# Patient Record
Sex: MALE | Race: OTHER | Employment: OTHER | URBAN - METROPOLITAN AREA
[De-identification: names, ages, dates, MRNs, and addresses within clinical notes are randomized per-mention and may not be internally consistent; named-entity substitution may affect disease eponyms.]

---

## 2017-10-03 ENCOUNTER — TELEPHONE (OUTPATIENT)
Dept: FAMILY MEDICINE CLINIC | Age: 27
End: 2017-10-03

## 2017-10-03 NOTE — TELEPHONE ENCOUNTER
Your Yadiel Napier and his wife are dear friends and would like to know if you will take his son as a new patient.

## 2020-11-18 ENCOUNTER — HOSPITAL ENCOUNTER (OUTPATIENT)
Age: 30
End: 2020-11-18
Payer: COMMERCIAL

## 2020-11-18 ENCOUNTER — HOSPITAL ENCOUNTER (OUTPATIENT)
Dept: GENERAL RADIOLOGY | Age: 30
Discharge: HOME | End: 2020-11-18
Payer: COMMERCIAL

## 2020-11-18 PROCEDURE — 71046 X-RAY EXAM CHEST 2 VIEWS: CPT

## 2022-01-13 ENCOUNTER — HOSPITAL ENCOUNTER (INPATIENT)
Age: 32
LOS: 5 days | Discharge: HOME | DRG: 208 | End: 2022-01-18
Attending: EMERGENCY MEDICINE | Admitting: STUDENT IN AN ORGANIZED HEALTH CARE EDUCATION/TRAINING PROGRAM
Payer: COMMERCIAL

## 2022-01-13 ENCOUNTER — APPOINTMENT (OUTPATIENT)
Dept: GENERAL RADIOLOGY | Age: 32
DRG: 208 | End: 2022-01-13
Payer: COMMERCIAL

## 2022-01-13 DIAGNOSIS — Z93.0 TRACHEOSTOMY DEPENDENT (HCC): ICD-10-CM

## 2022-01-13 DIAGNOSIS — J18.9 PNEUMONIA OF LEFT LOWER LOBE DUE TO INFECTIOUS ORGANISM: Primary | ICD-10-CM

## 2022-01-13 DIAGNOSIS — Z20.822 EXPOSURE TO CONFIRMED CASE OF COVID-19: ICD-10-CM

## 2022-01-13 PROBLEM — J96.20 ACUTE ON CHRONIC RESPIRATORY FAILURE (HCC): Status: ACTIVE | Noted: 2022-01-13

## 2022-01-13 LAB
A/G RATIO: 1.1 (ref 1.1–2.2)
ALBUMIN SERPL-MCNC: 4.3 G/DL (ref 3.4–5)
ALP BLD-CCNC: 98 U/L (ref 40–129)
ALT SERPL-CCNC: 48 U/L (ref 10–40)
ANION GAP SERPL CALCULATED.3IONS-SCNC: 12 MMOL/L (ref 3–16)
AST SERPL-CCNC: 56 U/L (ref 15–37)
BASOPHILS ABSOLUTE: 0 K/UL (ref 0–0.2)
BASOPHILS RELATIVE PERCENT: 0.2 %
BILIRUB SERPL-MCNC: 0.6 MG/DL (ref 0–1)
BUN BLDV-MCNC: 34 MG/DL (ref 7–20)
CALCIUM SERPL-MCNC: 8.8 MG/DL (ref 8.3–10.6)
CHLORIDE BLD-SCNC: 104 MMOL/L (ref 99–110)
CO2: 24 MMOL/L (ref 21–32)
CREAT SERPL-MCNC: 0.7 MG/DL (ref 0.9–1.3)
EOSINOPHILS ABSOLUTE: 0 K/UL (ref 0–0.6)
EOSINOPHILS RELATIVE PERCENT: 0 %
GFR AFRICAN AMERICAN: >60
GFR NON-AFRICAN AMERICAN: >60
GLUCOSE BLD-MCNC: 102 MG/DL (ref 70–99)
HCT VFR BLD CALC: 41.4 % (ref 40.5–52.5)
HEMOGLOBIN: 14.2 G/DL (ref 13.5–17.5)
LACTIC ACID, SEPSIS: 1 MMOL/L (ref 0.4–1.9)
LYMPHOCYTES ABSOLUTE: 0.5 K/UL (ref 1–5.1)
LYMPHOCYTES RELATIVE PERCENT: 16.6 %
MCH RBC QN AUTO: 31.1 PG (ref 26–34)
MCHC RBC AUTO-ENTMCNC: 34.2 G/DL (ref 31–36)
MCV RBC AUTO: 90.8 FL (ref 80–100)
MONOCYTES ABSOLUTE: 0.7 K/UL (ref 0–1.3)
MONOCYTES RELATIVE PERCENT: 22.3 %
NEUTROPHILS ABSOLUTE: 1.9 K/UL (ref 1.7–7.7)
NEUTROPHILS RELATIVE PERCENT: 60.9 %
PDW BLD-RTO: 13.1 % (ref 12.4–15.4)
PLATELET # BLD: 130 K/UL (ref 135–450)
PMV BLD AUTO: 9.3 FL (ref 5–10.5)
POTASSIUM REFLEX MAGNESIUM: 3.6 MMOL/L (ref 3.5–5.1)
PROCALCITONIN: 0.61 NG/ML (ref 0–0.15)
RAPID INFLUENZA  B AGN: NEGATIVE
RAPID INFLUENZA A AGN: NEGATIVE
RBC # BLD: 4.56 M/UL (ref 4.2–5.9)
SARS-COV-2, NAAT: NOT DETECTED
SODIUM BLD-SCNC: 140 MMOL/L (ref 136–145)
TOTAL PROTEIN: 8.1 G/DL (ref 6.4–8.2)
WBC # BLD: 3 K/UL (ref 4–11)

## 2022-01-13 PROCEDURE — 96361 HYDRATE IV INFUSION ADD-ON: CPT

## 2022-01-13 PROCEDURE — 87804 INFLUENZA ASSAY W/OPTIC: CPT

## 2022-01-13 PROCEDURE — 96365 THER/PROPH/DIAG IV INF INIT: CPT

## 2022-01-13 PROCEDURE — 80053 COMPREHEN METABOLIC PANEL: CPT

## 2022-01-13 PROCEDURE — 99282 EMERGENCY DEPT VISIT SF MDM: CPT

## 2022-01-13 PROCEDURE — 2500000003 HC RX 250 WO HCPCS: Performed by: STUDENT IN AN ORGANIZED HEALTH CARE EDUCATION/TRAINING PROGRAM

## 2022-01-13 PROCEDURE — 94002 VENT MGMT INPAT INIT DAY: CPT

## 2022-01-13 PROCEDURE — 71045 X-RAY EXAM CHEST 1 VIEW: CPT

## 2022-01-13 PROCEDURE — U0005 INFEC AGEN DETEC AMPLI PROBE: HCPCS

## 2022-01-13 PROCEDURE — 87040 BLOOD CULTURE FOR BACTERIA: CPT

## 2022-01-13 PROCEDURE — 83605 ASSAY OF LACTIC ACID: CPT

## 2022-01-13 PROCEDURE — 2580000003 HC RX 258: Performed by: PHYSICIAN ASSISTANT

## 2022-01-13 PROCEDURE — 6360000002 HC RX W HCPCS: Performed by: PHYSICIAN ASSISTANT

## 2022-01-13 PROCEDURE — 84145 PROCALCITONIN (PCT): CPT

## 2022-01-13 PROCEDURE — 85025 COMPLETE CBC W/AUTO DIFF WBC: CPT

## 2022-01-13 PROCEDURE — U0003 INFECTIOUS AGENT DETECTION BY NUCLEIC ACID (DNA OR RNA); SEVERE ACUTE RESPIRATORY SYNDROME CORONAVIRUS 2 (SARS-COV-2) (CORONAVIRUS DISEASE [COVID-19]), AMPLIFIED PROBE TECHNIQUE, MAKING USE OF HIGH THROUGHPUT TECHNOLOGIES AS DESCRIBED BY CMS-2020-01-R: HCPCS

## 2022-01-13 PROCEDURE — 2580000003 HC RX 258: Performed by: EMERGENCY MEDICINE

## 2022-01-13 PROCEDURE — 5A1935Z RESPIRATORY VENTILATION, LESS THAN 24 CONSECUTIVE HOURS: ICD-10-PCS | Performed by: STUDENT IN AN ORGANIZED HEALTH CARE EDUCATION/TRAINING PROGRAM

## 2022-01-13 PROCEDURE — 1200000000 HC SEMI PRIVATE

## 2022-01-13 PROCEDURE — 87635 SARS-COV-2 COVID-19 AMP PRB: CPT

## 2022-01-13 PROCEDURE — 96367 TX/PROPH/DG ADDL SEQ IV INF: CPT

## 2022-01-13 RX ORDER — LORATADINE 10 MG/1
10 TABLET ORAL DAILY PRN
COMMUNITY

## 2022-01-13 RX ORDER — SODIUM CHLORIDE 9 MG/ML
INJECTION, SOLUTION INTRAVENOUS CONTINUOUS
Status: ACTIVE | OUTPATIENT
Start: 2022-01-13 | End: 2022-01-14

## 2022-01-13 RX ORDER — 0.9 % SODIUM CHLORIDE 0.9 %
1000 INTRAVENOUS SOLUTION INTRAVENOUS ONCE
Status: COMPLETED | OUTPATIENT
Start: 2022-01-13 | End: 2022-01-13

## 2022-01-13 RX ORDER — ACETAMINOPHEN 160 MG/5ML
SUSPENSION ORAL
COMMUNITY

## 2022-01-13 RX ORDER — BACITRACIN ZINC AND POLYMYXIN B SULFATE 500; 1000 [USP'U]/G; [USP'U]/G
OINTMENT TOPICAL 3 TIMES DAILY
COMMUNITY

## 2022-01-13 RX ORDER — FOLIC ACID 1 MG/1
1 TABLET ORAL DAILY
COMMUNITY

## 2022-01-13 RX ORDER — HEPARIN SODIUM 5000 [USP'U]/ML
5000 INJECTION, SOLUTION INTRAVENOUS; SUBCUTANEOUS EVERY 8 HOURS SCHEDULED
Status: CANCELLED | OUTPATIENT
Start: 2022-01-13

## 2022-01-13 RX ORDER — LACTOSE-REDUCED FOOD/FIBER 0.07 G-1.5
500 LIQUID (ML) ORAL 3 TIMES DAILY
COMMUNITY

## 2022-01-13 RX ORDER — DEXTROAMPHETAMINE SACCHARATE, AMPHETAMINE ASPARTATE MONOHYDRATE, DEXTROAMPHETAMINE SULFATE AND AMPHETAMINE SULFATE 5; 5; 5; 5 MG/1; MG/1; MG/1; MG/1
20 CAPSULE, EXTENDED RELEASE ORAL DAILY PRN
Status: ON HOLD | COMMUNITY
End: 2022-01-14

## 2022-01-13 RX ORDER — FLUTICASONE PROPIONATE 44 UG/1
2 AEROSOL, METERED RESPIRATORY (INHALATION) DAILY
COMMUNITY

## 2022-01-13 RX ADMIN — SODIUM CHLORIDE 1000 ML: 9 INJECTION, SOLUTION INTRAVENOUS at 20:07

## 2022-01-13 RX ADMIN — CEFTRIAXONE 1000 MG: 1 INJECTION, POWDER, FOR SOLUTION INTRAMUSCULAR; INTRAVENOUS at 21:46

## 2022-01-13 RX ADMIN — METRONIDAZOLE 500 MG: 500 INJECTION, SOLUTION INTRAVENOUS at 23:36

## 2022-01-13 RX ADMIN — AZITHROMYCIN MONOHYDRATE 500 MG: 500 INJECTION, POWDER, LYOPHILIZED, FOR SOLUTION INTRAVENOUS at 22:19

## 2022-01-13 ASSESSMENT — PULMONARY FUNCTION TESTS: PIF_VALUE: 17

## 2022-01-13 ASSESSMENT — ENCOUNTER SYMPTOMS
ABDOMINAL PAIN: 0
SHORTNESS OF BREATH: 0
COUGH: 1
VOMITING: 0
NAUSEA: 1
SORE THROAT: 1
DIARRHEA: 1
CHEST TIGHTNESS: 0

## 2022-01-13 ASSESSMENT — PAIN DESCRIPTION - DESCRIPTORS: DESCRIPTORS: ACHING

## 2022-01-13 ASSESSMENT — PAIN DESCRIPTION - PAIN TYPE: TYPE: ACUTE PAIN

## 2022-01-13 ASSESSMENT — PAIN SCALES - GENERAL: PAINLEVEL_OUTOF10: 6

## 2022-01-13 ASSESSMENT — PAIN DESCRIPTION - LOCATION: LOCATION: HEAD

## 2022-01-14 LAB
ANION GAP SERPL CALCULATED.3IONS-SCNC: 12 MMOL/L (ref 3–16)
BASOPHILS ABSOLUTE: 0 K/UL (ref 0–0.2)
BASOPHILS RELATIVE PERCENT: 0.1 %
BUN BLDV-MCNC: 26 MG/DL (ref 7–20)
C-REACTIVE PROTEIN: 67 MG/L (ref 0–5.1)
CALCIUM SERPL-MCNC: 7.8 MG/DL (ref 8.3–10.6)
CHLORIDE BLD-SCNC: 104 MMOL/L (ref 99–110)
CO2: 22 MMOL/L (ref 21–32)
CREAT SERPL-MCNC: 0.6 MG/DL (ref 0.9–1.3)
EOSINOPHILS ABSOLUTE: 0 K/UL (ref 0–0.6)
EOSINOPHILS RELATIVE PERCENT: 0.2 %
GFR AFRICAN AMERICAN: >60
GFR NON-AFRICAN AMERICAN: >60
GLUCOSE BLD-MCNC: 109 MG/DL (ref 70–99)
HCT VFR BLD CALC: 35.7 % (ref 40.5–52.5)
HEMOGLOBIN: 12.4 G/DL (ref 13.5–17.5)
LACTIC ACID, SEPSIS: 1.1 MMOL/L (ref 0.4–1.9)
LYMPHOCYTES ABSOLUTE: 0.5 K/UL (ref 1–5.1)
LYMPHOCYTES RELATIVE PERCENT: 11.1 %
MAGNESIUM: 1.9 MG/DL (ref 1.8–2.4)
MCH RBC QN AUTO: 31.4 PG (ref 26–34)
MCHC RBC AUTO-ENTMCNC: 34.9 G/DL (ref 31–36)
MCV RBC AUTO: 89.9 FL (ref 80–100)
MONOCYTES ABSOLUTE: 0.3 K/UL (ref 0–1.3)
MONOCYTES RELATIVE PERCENT: 7.9 %
NEUTROPHILS ABSOLUTE: 3.5 K/UL (ref 1.7–7.7)
NEUTROPHILS RELATIVE PERCENT: 80.7 %
PDW BLD-RTO: 13.1 % (ref 12.4–15.4)
PLATELET # BLD: 108 K/UL (ref 135–450)
PMV BLD AUTO: 10.2 FL (ref 5–10.5)
POTASSIUM SERPL-SCNC: 3.7 MMOL/L (ref 3.5–5.1)
PROCALCITONIN: 0.5 NG/ML (ref 0–0.15)
RBC # BLD: 3.97 M/UL (ref 4.2–5.9)
SARS-COV-2: DETECTED
SODIUM BLD-SCNC: 138 MMOL/L (ref 136–145)
WBC # BLD: 4.3 K/UL (ref 4–11)

## 2022-01-14 PROCEDURE — 83605 ASSAY OF LACTIC ACID: CPT

## 2022-01-14 PROCEDURE — 6360000002 HC RX W HCPCS: Performed by: INTERNAL MEDICINE

## 2022-01-14 PROCEDURE — 6370000000 HC RX 637 (ALT 250 FOR IP): Performed by: STUDENT IN AN ORGANIZED HEALTH CARE EDUCATION/TRAINING PROGRAM

## 2022-01-14 PROCEDURE — 6360000002 HC RX W HCPCS: Performed by: STUDENT IN AN ORGANIZED HEALTH CARE EDUCATION/TRAINING PROGRAM

## 2022-01-14 PROCEDURE — 94761 N-INVAS EAR/PLS OXIMETRY MLT: CPT

## 2022-01-14 PROCEDURE — 2000000000 HC ICU R&B

## 2022-01-14 PROCEDURE — 2580000003 HC RX 258: Performed by: STUDENT IN AN ORGANIZED HEALTH CARE EDUCATION/TRAINING PROGRAM

## 2022-01-14 PROCEDURE — 2580000003 HC RX 258: Performed by: INTERNAL MEDICINE

## 2022-01-14 PROCEDURE — 36415 COLL VENOUS BLD VENIPUNCTURE: CPT

## 2022-01-14 PROCEDURE — 86140 C-REACTIVE PROTEIN: CPT

## 2022-01-14 PROCEDURE — 80048 BASIC METABOLIC PNL TOTAL CA: CPT

## 2022-01-14 PROCEDURE — 51798 US URINE CAPACITY MEASURE: CPT

## 2022-01-14 PROCEDURE — 94003 VENT MGMT INPAT SUBQ DAY: CPT

## 2022-01-14 PROCEDURE — 99223 1ST HOSP IP/OBS HIGH 75: CPT | Performed by: INTERNAL MEDICINE

## 2022-01-14 PROCEDURE — 2700000000 HC OXYGEN THERAPY PER DAY

## 2022-01-14 PROCEDURE — 87040 BLOOD CULTURE FOR BACTERIA: CPT

## 2022-01-14 PROCEDURE — 85025 COMPLETE CBC W/AUTO DIFF WBC: CPT

## 2022-01-14 PROCEDURE — 84145 PROCALCITONIN (PCT): CPT

## 2022-01-14 PROCEDURE — 51701 INSERT BLADDER CATHETER: CPT

## 2022-01-14 PROCEDURE — 83735 ASSAY OF MAGNESIUM: CPT

## 2022-01-14 RX ORDER — SODIUM CHLORIDE 9 MG/ML
25 INJECTION, SOLUTION INTRAVENOUS PRN
Status: DISCONTINUED | OUTPATIENT
Start: 2022-01-14 | End: 2022-01-18 | Stop reason: HOSPADM

## 2022-01-14 RX ORDER — DEXAMETHASONE SODIUM PHOSPHATE 10 MG/ML
10 INJECTION, SOLUTION INTRAMUSCULAR; INTRAVENOUS EVERY 24 HOURS
Status: DISCONTINUED | OUTPATIENT
Start: 2022-01-14 | End: 2022-01-17

## 2022-01-14 RX ORDER — GUAIFENESIN/DEXTROMETHORPHAN 100-10MG/5
5 SYRUP ORAL EVERY 4 HOURS PRN
Status: DISCONTINUED | OUTPATIENT
Start: 2022-01-14 | End: 2022-01-18 | Stop reason: HOSPADM

## 2022-01-14 RX ORDER — AZITHROMYCIN 500 MG/1
500 TABLET, FILM COATED ORAL EVERY 24 HOURS
Status: DISCONTINUED | OUTPATIENT
Start: 2022-01-14 | End: 2022-01-14

## 2022-01-14 RX ORDER — ACETAMINOPHEN 325 MG/1
650 TABLET ORAL EVERY 6 HOURS PRN
Status: DISCONTINUED | OUTPATIENT
Start: 2022-01-14 | End: 2022-01-18 | Stop reason: HOSPADM

## 2022-01-14 RX ORDER — ONDANSETRON 2 MG/ML
4 INJECTION INTRAMUSCULAR; INTRAVENOUS EVERY 6 HOURS PRN
Status: DISCONTINUED | OUTPATIENT
Start: 2022-01-14 | End: 2022-01-18 | Stop reason: HOSPADM

## 2022-01-14 RX ORDER — ACETAMINOPHEN 650 MG/1
650 SUPPOSITORY RECTAL EVERY 6 HOURS PRN
Status: DISCONTINUED | OUTPATIENT
Start: 2022-01-14 | End: 2022-01-18 | Stop reason: HOSPADM

## 2022-01-14 RX ORDER — SODIUM CHLORIDE 0.9 % (FLUSH) 0.9 %
5-40 SYRINGE (ML) INJECTION EVERY 12 HOURS SCHEDULED
Status: DISCONTINUED | OUTPATIENT
Start: 2022-01-14 | End: 2022-01-18 | Stop reason: HOSPADM

## 2022-01-14 RX ORDER — SODIUM CHLORIDE 0.9 % (FLUSH) 0.9 %
5-40 SYRINGE (ML) INJECTION PRN
Status: DISCONTINUED | OUTPATIENT
Start: 2022-01-14 | End: 2022-01-18 | Stop reason: HOSPADM

## 2022-01-14 RX ADMIN — AMPICILLIN SODIUM AND SULBACTAM SODIUM 3000 MG: 2; 1 INJECTION, POWDER, FOR SOLUTION INTRAMUSCULAR; INTRAVENOUS at 04:40

## 2022-01-14 RX ADMIN — DEXAMETHASONE SODIUM PHOSPHATE 10 MG: 10 INJECTION, SOLUTION INTRAMUSCULAR; INTRAVENOUS at 04:38

## 2022-01-14 RX ADMIN — Medication 10 ML: at 21:05

## 2022-01-14 RX ADMIN — AMPICILLIN SODIUM AND SULBACTAM SODIUM 3000 MG: 2; 1 INJECTION, POWDER, FOR SOLUTION INTRAMUSCULAR; INTRAVENOUS at 15:16

## 2022-01-14 RX ADMIN — SODIUM CHLORIDE: 9 INJECTION, SOLUTION INTRAVENOUS at 01:35

## 2022-01-14 RX ADMIN — ENOXAPARIN SODIUM 40 MG: 100 INJECTION SUBCUTANEOUS at 09:29

## 2022-01-14 RX ADMIN — AMPICILLIN SODIUM AND SULBACTAM SODIUM 3000 MG: 2; 1 INJECTION, POWDER, FOR SOLUTION INTRAMUSCULAR; INTRAVENOUS at 09:27

## 2022-01-14 RX ADMIN — PIPERACILLIN AND TAZOBACTAM 3375 MG: 3; .375 INJECTION, POWDER, LYOPHILIZED, FOR SOLUTION INTRAVENOUS at 21:07

## 2022-01-14 RX ADMIN — AZITHROMYCIN MONOHYDRATE 500 MG: 500 TABLET ORAL at 09:29

## 2022-01-14 ASSESSMENT — PAIN SCALES - GENERAL
PAINLEVEL_OUTOF10: 0

## 2022-01-14 ASSESSMENT — PULMONARY FUNCTION TESTS
PIF_VALUE: 23
PIF_VALUE: 27
PIF_VALUE: 22
PIF_VALUE: 19
PIF_VALUE: 17

## 2022-01-14 NOTE — PROGRESS NOTES
Hospitalist Progress Note      PCP: No primary care provider on file. Date of Admission: 1/13/2022        Subjective: No fever or chills no chest pain. Medications:  Reviewed    Infusion Medications    sodium chloride      sodium chloride 100 mL/hr at 01/14/22 0135     Scheduled Medications    sodium chloride flush  5-40 mL IntraVENous 2 times per day    enoxaparin  40 mg SubCUTAneous Daily    azithromycin  500 mg Oral Q24H    ampicillin-sulbactam  3,000 mg IntraVENous Q6H    dexamethasone  10 mg IntraVENous Q24H     PRN Meds: sodium chloride flush, sodium chloride, acetaminophen **OR** acetaminophen, ondansetron, guaiFENesin-dextromethorphan      Intake/Output Summary (Last 24 hours) at 1/14/2022 1229  Last data filed at 1/14/2022 0958  Gross per 24 hour   Intake 1048.95 ml   Output 900 ml   Net 148.95 ml       Physical Exam Performed:    /70   Pulse 72   Temp 98 °F (36.7 °C) (Oral)   Resp 18   Wt 121 lb 4.1 oz (55 kg)   SpO2 100%     General appearance: No apparent distress  Neck: trach in place   Respiratory:  Normal respiratory effort. Clear to auscultation, bilaterally without Rales/Wheezes/Rhonchi. Cardiovascular: Regular rate and rhythm with normal S1/S2 without murmurs, rubs or gallops. Abdomen: Soft, non-tender, non-distended, PEG in place. Musculoskeletal: No clubbing, cyanosis   Skin: Skin color, texture, turgor normal.  No rashes or lesions.   Neurologic:  No new weakness   Psychiatric: Alert   Capillary Refill: Brisk,3 seconds, normal   Peripheral Pulses: +2 palpable, equal bilaterally       Labs:   Recent Labs     01/13/22 1902 01/14/22 0453   WBC 3.0* 4.3   HGB 14.2 12.4*   HCT 41.4 35.7*   * 108*     Recent Labs     01/13/22 1902 01/14/22 0453    138   K 3.6 3.7    104   CO2 24 22   BUN 34* 26*   CREATININE 0.7* 0.6*   CALCIUM 8.8 7.8*     Recent Labs     01/13/22 1902   AST 56*   ALT 48*   BILITOT 0.6   ALKPHOS 98     No results for input(s): INR in the last 72 hours. No results for input(s): [place] in the last 72 hours. Urinalysis:    No results found for: [place] Calv[place], BACTERIA, RBCUA, BLOODU, [address], [address]    Radiology:  XR CHEST PORTABLE   Final Result      Focal consolidative change within the left lower lobe. Finding may be   suggestive of atelectasis or focal pneumonia. Assessment/Plan:    Active Hospital Problems    Diagnosis     Suspected COVID-19 virus infection [Z20.822]     Pneumonia [J18.9]     Acute on chronic respiratory failure ([facility] [address] [J96.20]      1. Acute on chronic respiratory failure patient on trach, supportive measures, keep saturation above 90%, will consult ID for further recommendations. 2.  Pneumonia, likely aspiration and COVID-pneumonia, patient was started on Unasyn and azithromycin at this time pending ID recommendations also patient was started on Decadron  3. COVID-19 infection as above  4. History of quadriplegia  5. Status post PEG        Diet: Diet NPO  ADULT TUBE FEEDING; Gastrostomy; Other Tube Feeding (specify); isosource 1.5 2cans;  Bolus; 4 TIMES DAILY; 240; 30; Before and after each bolus  Code Status: Full Code      Radha Corral MD

## 2022-01-14 NOTE — PROGRESS NOTES
Comprehensive Nutrition Assessment    Type and Reason for Visit:       Nutrition Recommendations/Plan:   Provide 240 ml  Jevity 1.5 4x per day as home TF regimen  Monitor tolerance  Monitor weight, labs, skin, bowels    Nutrition Assessment:  Pt admitted with suspected Covid, respiratory failure and pneumonia. Pt is quadraplegic with PEG tube. Order received for 240 ml 4x per day of Isosource as pt was receiving at home. Will substitur house equivalent of Jevity 1.5. This meets pts needs providing 1440 calories and 61 gm protein. Will monitor for tolerance. Malnutrition Assessment:  Malnutrition Status:  Insufficient data    Context:  Acute Illness       RD did not conduct direct, in-person nutrition evaluation in efforts to reduce exposure and use of PPE for high risk persons, PUI persons, patients who have tested positive for Covid-19 or those awaiting respiratory panel results. EMR was screened for nutrition risk factors, as defined per nutrition standards of care. Estimated Daily Nutrient Needs:  Energy (kcal):  5744-4463 (25-30 x 55 kgm CBW); Weight Used for Energy Requirements:  Current     Protein (g):  55-72 (1-1.3 x 55 CBW); Weight Used for Protein Requirements:  Current        Fluid (ml/day):  per provider; Method Used for Fluid Requirements:         Nutrition Related Findings:  no edema, no BM recorded      Wounds:  None       Current Nutrition Therapies:    Diet NPO  ADULT TUBE FEEDING; Gastrostomy; Other Tube Feeding (specify); isosource 1.5 2cans; Bolus; 4 TIMES DAILY; 240; 30; Before and after each bolus    Anthropometric Measures:  · Height: 5' (152.4 cm)  · Current Body Weight: 121 lb 4.1 oz (55 kg)      · Ideal Body Weight: 106 lbs; % Ideal Body Weight 114.4 %   · BMI: 23.7  · Adjusted Body Weight:  ; No Adjustment   · BMI Categories: Normal Weight (BMI 18.5-24. 9)       Nutrition Diagnosis:   · Inadequate oral intake related to acute injury/trauma as evidenced by NPO or clear liquid status due to medical condition      Nutrition Interventions:   Food and/or Nutrient Delivery:  Continue Oral Nutrition Supplement  Nutrition Education/Counseling:  No recommendation at this time   Coordination of Nutrition Care:  Continue to monitor while inpatient    Goals:   Tolerance of TF at goal rate       Nutrition Monitoring and Evaluation:   Behavioral-Environmental Outcomes:  None Identified   Food/Nutrient Intake Outcomes:  Enteral Nutrition Intake/Tolerance  Physical Signs/Symptoms Outcomes:  Biochemical Data,Fluid Status or Edema,Nutrition Focused Physical Findings,Skin,Weight     Discharge Planning:    No discharge needs at this time     Electronically signed by Ryland Ruiz RD, LD on 1/14/22 at 3:40 PM EST    Contact: [phone]

## 2022-01-14 NOTE — PROGRESS NOTES
Patient arrives to unit; patient presents with chronic trach; patient alert and oriented x 4 nods and uses written communication appropriately. Patient currently on ventilator; spare trach at bedside. Skin intact; chronic g-tube; some redness around g-tube site; denies pain.  Patient states he is able to urinate some on his own but is primarily straight cathed

## 2022-01-14 NOTE — ED PROVIDER NOTES
[address] [place] [address]  eMERGENCY dEPARTMENT eNCOUnter   Physician Attestation    Pt Name: Alexia Burdick  MRN: [id number]  Davongfjoel 1990  Date of evaluation: 1/13/22        Physician Note:    I havepersonally performed and/or participated in the history, exam and medical decision making and agree with all pertinent clinical information. I have also reviewed and agree with the past medical, family and social historyunless otherwise noted. I have personally performed a face to face diagnostic evaluation onthis patient. I have reviewed the mid-levels findings and agree. History: Sx's started Sunday with HA, cough congestion. Covid positive at home. Quadriplegic from prior discussion he is more quadriparetic that he is plegic. Jennifer Brittle Vent dependent but does have periods where he can breathe on his own but will go apneic when he sleeps. Patient does use a ventilator at night. However he has not really needed any oxygen. He does still walk. His right side is weaker than the left side. Basically, just started getting ill on Sunday with aches pains congestion and fever. His home COVID test was positive. Physical Exam  Constitutional:       Appearance: He is well-developed and underweight. He is not diaphoretic. HENT:      Head: Normocephalic and atraumatic. Right Ear: External ear normal.      Left Ear: External ear normal.   Eyes:      General: No scleral icterus. Right eye: No discharge. Left eye: No discharge. Neck:      Thyroid: No thyromegaly. Vascular: No JVD. Trachea: No tracheal deviation. Cardiovascular:      Rate and Rhythm: Regular rhythm. Tachycardia present. Heart sounds: Normal heart sounds. No murmur heard. No friction rub. No gallop. Pulmonary:      Breath sounds: No stridor. No wheezing or rales. Comments: Harsh breath sounds  Abdominal:      General: There is no distension. Palpations: Abdomen is soft.       Tenderness: There is no abdominal tenderness. There is no guarding or rebound. Musculoskeletal:         General: No tenderness. Cervical back: Normal range of motion. Skin:     General: Skin is warm and dry. Findings: No rash (On exposed body surfaces). Neurological:      Mental Status: He is alert and oriented to person, place, and time. Motor: Weakness present. Comments: He does have use of all 4 extremities. He is a bit weaker on the right but able to break gravity. Psychiatric:         Behavior: Behavior normal.         Thought Content: Thought content normal.       XR CHEST PORTABLE    Result Date: 1/13/2022  EXAMINATION: ONE XRAY VIEW OF THE CHEST 1/13/2022 6:58 pm COMPARISON: 11/18/2020. HISTORY: ORDERING SYSTEM PROVIDED HISTORY: cough, sob, trach dependent TECHNOLOGIST PROVIDED HISTORY: Reason for exam:->cough, sob, trach dependent Reason for Exam: cough, sob, trach dependent, covid positive FINDINGS: Chest radiograph: The cardiomediastinal silhouette and hilar contours are normal.  Thoracostomy tube noted. Consolidative change within the left lower lobe. The lungs are otherwise clear with no  pleural effusion or pneumothorax. The overlying soft tissue and osseous structures do not demonstrate acute abnormality. Mild dextroscoliosis of midthoracic spine     Focal consolidative change within the left lower lobe. Finding may be suggestive of atelectasis or focal pneumonia.    Results for orders placed or performed during the hospital encounter of 01/13/22   COVID-19, Rapid    Specimen: Nasopharyngeal Swab   Result Value Ref Range    SARS-CoV-2, NAAT Not Detected Not Detected   Rapid influenza A/B antigens    Specimen: Nares   Result Value Ref Range    Rapid Influenza A Ag Negative Negative    Rapid Influenza B Ag Negative Negative   CBC Auto Differential   Result Value Ref Range    WBC 3.0 (L) 4.0 - 11.0 K/uL    RBC 4.56 4.20 - 5.90 M/uL    Hemoglobin 14.2 13.5 - 17.5 g/dL Hematocrit 41.4 40.5 - 52.5 %    MCV 90.8 80.0 - 100.0 fL    MCH 31.1 26.0 - 34.0 pg    MCHC 34.2 31.0 - 36.0 g/dL    RDW 13.1 12.4 - 15.4 %    Platelets 880 (L) 304 - 450 K/uL    MPV 9.3 5.0 - 10.5 fL    Neutrophils % 60.9 %    Lymphocytes % 16.6 %    Monocytes % 22.3 %    Eosinophils % 0.0 %    Basophils % 0.2 %    Neutrophils Absolute 1.9 1.7 - 7.7 K/uL    Lymphocytes Absolute 0.5 (L) 1.0 - 5.1 K/uL    Monocytes Absolute 0.7 0.0 - 1.3 K/uL    Eosinophils Absolute 0.0 0.0 - 0.6 K/uL    Basophils Absolute 0.0 0.0 - 0.2 K/uL   Comprehensive Metabolic Panel w/ Reflex to MG   Result Value Ref Range    Sodium 140 136 - 145 mmol/L    Potassium reflex Magnesium 3.6 3.5 - 5.1 mmol/L    Chloride 104 99 - 110 mmol/L    CO2 24 21 - 32 mmol/L    Anion Gap 12 3 - 16    Glucose 102 (H) 70 - 99 mg/dL    BUN 34 (H) 7 - 20 mg/dL    CREATININE 0.7 (L) 0.9 - 1.3 mg/dL    GFR Non-African American >60 >60    GFR African American >60 >60    Calcium 8.8 8.3 - 10.6 mg/dL    Total Protein 8.1 6.4 - 8.2 g/dL    Albumin 4.3 3.4 - 5.0 g/dL    Albumin/Globulin Ratio 1.1 1.1 - 2.2    Total Bilirubin 0.6 0.0 - 1.0 mg/dL    Alkaline Phosphatase 98 40 - 129 U/L    ALT 48 (H) 10 - 40 U/L    AST 56 (H) 15 - 37 U/L   Lactate, Sepsis   Result Value Ref Range    Lactic Acid, Sepsis 1.0 0.4 - 1.9 mmol/L   Procalcitonin   Result Value Ref Range    Procalcitonin 0.61 (H) 0.00 - 0.15 ng/mL     Our rapid COVID was negative but he did have 1 that was positive at home. We will do a PCR. For now we will treat him as if he has a regular pneumonia. 1. Pneumonia of left lower lobe due to infectious organism          DISPOSITION/PLAN  PATIENT REFERRED TO:  No follow-up provider specified.   DISCHARGE MEDICATIONS:  New Prescriptions    No medications on file         MD Domenico Blanc MD  01/13/22 4755

## 2022-01-14 NOTE — CARE COORDINATION
Advance Care Planning     Advance Care Planning Activator (Inpatient)  Conversation Note      Date of ACP Conversation: 1/14/2022     Conversation Conducted with:  Healthcare Decision Maker: Next of Kin by law (only applies in absence of above) (name) father Yamile Giraldo    ACP Activator: Minnie Duarte, [phone] [phone] Maker:     Current Designated Health Care Decision Maker:     Primary Decision Maker: Darrell Leon - Parent - [phone]    Care Preferences    Ventilation: \"If you were in your present state of health and suddenly became very ill and were unable to breathe on your own, what would your preference be about the use of a ventilator (breathing machine) if it were available to you? \"      Would the patient desire the use of ventilator (breathing machine)?: yes    \"If your health worsens and it becomes clear that your chance of recovery is unlikely, what would your preference be about the use of a ventilator (breathing machine) if it were available to you? \"     Would the patient desire the use of ventilator (breathing machine)?: Yes      Resuscitation  \"CPR works best to restart the heart when there is a sudden event, like a heart attack, in someone who is otherwise healthy. Unfortunately, CPR does not typically restart the heart for people who have serious health conditions or who are very sick. \"    \"In the event your heart stopped as a result of an underlying serious health condition, would you want attempts to be made to restart your heart (answer \"yes\" for attempt to resuscitate) or would you prefer a natural death (answer \"no\" for do not attempt to resuscitate)? \" yes       [] Yes   [x] No   Educated Patient / June Stubbs regarding differences between Advance Directives and portable DNR orders.     Length of ACP Conversation in minutes:  5 minutes    Conversation Outcomes:  [x] ACP discussion completed  [] Existing advance directive reviewed with patient; no changes to patient's previously recorded wishes  [] New Advance Directive completed  [] Portable Do Not Rescitate prepared for Provider review and signature  [] POLST/POST/MOLST/MOST prepared for Provider review and signature      Follow-up plan:    [] Schedule follow-up conversation to continue planning  [] Referred individual to Provider for additional questions/concerns   [] Advised patient/agent/surrogate to review completed ACP document and update if needed with changes in condition, patient preferences or care setting    [x] This note routed to one or more involved healthcare providers  Electronically signed by Bipin Street RN Case Management [phone] on 1/14/2022 at 1:25 PM

## 2022-01-14 NOTE — H&P
Hospital Medicine History & Physical      PCP: No primary care provider on file. Date of Admission: 1/13/2022    Date of Service: Pt seen/examined on 1/13/2022 and Admitted to Inpatient     Chief Complaint: Fatigue, shortness of breath, fever and chills, decreased activity level      History Of Present Illness: The patient is a 27 y.o. male with past medical history of quadriplegia from previous C1/C2 spinal cord injury at birth, chronic trach and G-tube in place, ADHD, ulcerative colitis, neurogenic bladder, central alveolar hypoventilation syndrome, who presents to [facility] brought by his father for concern that for the last 2 to 3 days patient and as well as family have had increasing levels of fatigue but family members are not as fatigued or sick as the patient is at this time. Apparently patient has also had increasing concerning lack of appetite and fatigue. He has seemed to be more short of breath according to the parents and apparently has had some intermittent fever and chills at home. They note that although he is quadriplegic he does have increased activity levels in some time able to ambulate and move appropriately. However in the last 2 to 3 days he has seemed more inactive and fatigued. Uncertain whether patient may have gotten sick from family members or from previous out going to [place]. Father notes that he, wife, and the patient all tested with a rapid COVID test at home and while the wife and him were both negative patient apparently tested positive which prompted further evaluation in the ED. Other than symptoms as above, father denies the patient has had any other symptoms such as diarrhea, vomiting, dysuria, blood in urine/stool/sputum, chest pain. Past Medical History:    No past medical history on file.     Past Surgical History: No past surgical history on file. Medications Prior to Admission:    Prior to Admission medications    Medication Sig Start Date End Date Taking? Authorizing Provider   acetaminophen (TYLENOL) 160 MG/5ML liquid Take 20mLs every 4 hours as needed through G-tube   Yes Historical Provider, MD   bacitracin-polymyxin b (POLYSPORIN) [id number] UNIT/GM ointment Apply topically 3 times daily   Yes Historical Provider, MD   amphetamine-dextroamphetamine (ADDERALL XR) 20 MG extended release capsule 20 mg daily as needed. Through G-tube   Yes Historical Provider, MD   fluticasone (FLOVENT HFA) 44 MCG/ACT inhaler Inhale 2 puffs into the lungs daily   Yes Historical Provider, MD   folic acid (FOLVITE) 1 MG tablet Take 1 mg by mouth daily   Yes Historical Provider, MD   Nutritional Supplements (ISOSOURCE 1.5 SOLITARIO) LIQD Take 500 mLs by mouth 3 times daily   Yes Historical Provider, MD   loratadine (CLARITIN) 10 MG tablet Take 10 mg by mouth daily as needed    Historical Provider, MD       Allergies: Other    Social History:  The patient currently lives home    TOBACCO:   has no history on file for tobacco use. ETOH:   has no history on file for alcohol use. Family History:  Reviewed in detail and negative for DM, Early CAD, Cancer, CVA. Positive as follows:    No family history on file. REVIEW OF SYSTEMS:    as noted in the HPI. All other systems reviewed and negative. PHYSICAL EXAM:    /67   Pulse 80   Temp 98.1 °F (36.7 °C) (Oral)   Resp 16   Wt 121 lb 4.1 oz (55 kg)   SpO2 99%     General appearance: Has trach collar and hooked up to ventilator, alert and oriented and able to follow conversation but limited speech ability at this time, nontoxic appearing but fatigued appearing  HEENT Normal cephalic, atraumatic without obvious deformity. Pupils equal, round, and reactive to light. Extra ocular muscles intact. Conjunctivae/corneas clear.   Dry mucous membranes,  Neck: Supple, tracheostomy is noted Merced Coreas is expected to be hospitalized for greater than 2 midnights based on the following assessment and plan:      ASSESSMENT/PLAN:  · Acute on chronic respiratory failure  · Pneumonia  · Suspected COVID-19 virus infection    Plan:  · Patient tested with a positive rapid test at home but then rapid test here came back negative, COVID PCR came back positive  · Suspect combination of possible COVID-pneumonia as well as aspiration pneumonia  · Started patient on Unasyn and Zithromax for broad-spectrum bacterial pneumonia coverage  · Start patient on Decadron IV for COVID-pneumonia  · Start patient on water  · Normal saline for IV fluid hydration x20 hours  · Repeat labs in the morning    DVT Prophylaxis: Lovenox  Diet: Diet NPO  Code Status: Full Code  PT/OT Eval Status: Ambulatory    Dispo -pending clinical course       Matt Campos DO    Thank you No primary care provider on file. for the opportunity to be involved in this patient's care. If you have any questions or concerns please feel free to contact me at [phone].

## 2022-01-14 NOTE — PLAN OF CARE
Patient presents with dysuria starting this am. He denies any concern for STI.    He also is concerned about a wart on left foot x 1month.     Problem: Nutrition  Goal: Optimal nutrition therapy  Outcome: Ongoing   Nutrition Problem #1: Inadequate oral intake  Intervention: Food and/or Nutrient Delivery: Continue Oral Nutrition Supplement  Nutritional Goals:  Tolerance of TF at goal rate

## 2022-01-14 NOTE — CONSULTS
Infectious Diseases Inpatient Consult Note      Reason for Consult:  COVID 19 PNA and resp failure    Requesting Physician:  Altagracia Waldrop     Primary Care Physician:  Karen Acevedo MD    History Obtained From:  Epic and Patient     CHIEF COMPLAINT:     Chief Complaint   Patient presents with    Positive For Covid-19     having weakness, headache, cough, has a trach. HISTORY OF PRESENT ILLNESS:  27 y.o. 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 Tested +Ve for COVID 19 , Procal 0.61  And CRP 67, mild Leukopenia, and thrombocytopenia, rapid flu negative and Blood cx in process. CXR with Left lower lobe consolidation - We are consulted for IV abx management - No history from patient due to Ventilated status through Abelino Angel-           Past Medical History:    Quadriplegia  Trach   PEG      Past Surgical History:    Trach and PEG     Current Medications:    Outpatient Medications Marked as Taking for the 1/13/22 encounter [facility] Encounter)   Medication Sig Dispense Refill    acetaminophen (TYLENOL) 160 MG/5ML liquid Take 20mLs every 4 hours as needed through G-tube      bacitracin-polymyxin b (POLYSPORIN) 500-89417 UNIT/GM ointment Apply topically 3 times daily      fluticasone (FLOVENT HFA) 44 MCG/ACT inhaler Inhale 2 puffs into the lungs daily      folic acid (FOLVITE) 1 MG tablet Take 1 mg by mouth daily      Nutritional Supplements (ISOSOURCE 1.5 SOLITARIO) LIQD Take 500 mLs by mouth 3 times daily         Allergies: Other    Immunizations : There is no immunization history on file for this patient.       Social History:    Social History     Tobacco Use    Smoking status: Not on file    Smokeless tobacco: Not on file   Substance Use Topics    Alcohol use: Not on file    Drug use: Not on file     Social History     Tobacco Use   Smoking Status Not on file   Smokeless Tobacco Not on file      Family history : no DVT no COPD       REVIEW OF SYSTEMS:     NOT Possible due to ventilated state   PHYSICAL EXAM:      Vitals:    /70   Pulse 72   Temp 98 °F (36.7 °C) (Oral)   Resp 18   Wt 121 lb 4.1 oz (55 kg)   SpO2 100%     General Appearance: AWAKE on the vent through Trach+  and in some acute distress, ++ pallor, no icterus   Skin: warm and dry, no rash or erythema  Head: normocephalic and atraumatic  Eyes: pupils equal, round, and reactive to light, conjunctivae normal  ENT: tympanic membrane, external ear and ear canal normal bilaterally, nose without deformity, nasal mucosa and turbinates normal without polyps  Neck: supple and non-tender without mass, no thyromegaly  no cervical lymphadenopathy  Pulmonary/Chest: BI basal crepts++  wheezes, rales or rhonchi, normal air movement, in some respiratory distress  Cardiovascular: l S1 and S2, no murmurs, rubs, clicks, or gallops, no carotid bruits  Abdomen: soft, non-tender, non-distended, normal bowel sounds, no masses or organomegaly  PEG tube   Extremities: Quadriplegia+   Musculoskeletal: normal range of motion, no joint swelling, deformity or tenderness  Integumentary: No rashes, no abnormal skin lesions, no petechiae  Neurologic: Quadriplegia+  Lines: IV    DATA:    CBC:   Lab Results   Component Value Date    WBC 4.3 01/14/2022    HGB 12.4 (L) 01/14/2022    HCT 35.7 (L) 01/14/2022    MCV 89.9 01/14/2022     (L) 01/14/2022     RENAL:   Lab Results   Component Value Date    CREATININE 0.6 (L) 01/14/2022    BUN 26 (H) 01/14/2022     01/14/2022    K 3.7 01/14/2022     01/14/2022    CO2 22 01/14/2022     SED RATE: No results found for: SEDRATE  CK: No results found for: CKTOTAL  CRP:   Lab Results   Component Value Date    CRP 67.0 01/14/2022     Hepatic Function Panel:   Lab Results   Component Value Date    ALKPHOS 98 01/13/2022    ALT 48 01/13/2022    AST 56 01/13/2022    PROT 8.1 01/13/2022    BILITOT 0.6 01/13/2022    LABALBU 4.3 01/13/2022     UA:No results found for: Yahaira Rajendra, GLUCOSEU, Rilla Rash, SPECGRAV, BLOODU, PHUR, PROTEINU, UROBILINOGEN, NITRU, LEUKOCYTESUR, LABMICR, URINETYPE   Urine Microscopic: No results found for: LABCAST, BACTERIA, COMU, HYALCAST, WBCUA, RBCUA, EPIU  Urine Reflex to Culture: No results found for: URRFLXCULT    Procal 0.5     CRP  67       MICRO: cultures reviewed and updated by me     Procedure Component Value Units Date/Time   Culture, Blood 2 [[id number]] Collected: 01/14/22 0324   Order Status: Sent Specimen: Blood Updated: 01/14/22 0335   Rapid influenza A/B antigens [[id number]] Collected: 01/13/22 1902   Order Status: Completed Specimen: Nares Updated: 01/13/22 1953    Rapid Influenza A Ag Negative    Rapid Influenza B Ag Negative   Narrative:     Performed at:   [facility]   [address] [facility] [employer] [address]   Phone [phone], Rapid [[id number]] Collected: 01/13/22 1902   Order Status: Completed Specimen: Nasopharyngeal Swab Updated: 01/13/22 1953    SARS-CoV-2, NAAT Not Detected    Comment: Rapid NAAT:   Negative results should be treated as presumptive and,   if inconsistent with clinical signs and symptoms or necessary for   patient management, should be tested with an alternative molecular   assay. Negative results do not preclude SARS-CoV-2 infection and   should not be used as the sole basis for patient management decisions. This test has been authorized by the FDA under an Emergency Use   Authorization (EUA) for use by authorized laboratories.      Fact sheet for Healthcare Providers:   BuildHer.es   Fact sheet for Patients: BuildHer.es     METHODOLOGY: Isothermal Nucleic Acid Amplification       Narrative:     Performed at:   [facility]   [address] [address]   Phone [phone]   Culture, Blood 1 [[id number]] Collected: 01/13/22 1902   Order Status: Sent Specimen: Blood Updated: 01/        Result Notes     Ref Range & Units 1/13/22 2035   SARS-CoV-2 Not detected Detected Abnormal     Comment: This test has been authorized by FDA under an   Emergency Use Authorization (EUA). This test is only authorized for the duration of the   time of declaration that circumstances exist justifying the   authorization of the emergency use of in vitro diagnostic          Blood Culture: No results found for: BC, BLOODCULT2    Viral Culture:    Lab Results   Component Value Date    COVID19 Detected 01/13/2022    COVID19 Not Detected 01/13/2022     Urine Culture: No results for input(s): Lisa Beck in the last 72 hours. Scheduled Meds:   sodium chloride flush  5-40 mL IntraVENous 2 times per day    enoxaparin  40 mg SubCUTAneous Daily    azithromycin  500 mg Oral Q24H    ampicillin-sulbactam  3,000 mg IntraVENous Q6H    dexamethasone  10 mg IntraVENous Q24H       Continuous Infusions:   sodium chloride      sodium chloride 100 mL/hr at 01/14/22 0135       PRN Meds:  sodium chloride flush, sodium chloride, acetaminophen **OR** acetaminophen, ondansetron, guaiFENesin-dextromethorphan    Imaging:   XR CHEST PORTABLE   Final Result      Focal consolidative change within the left lower lobe. Finding may be   suggestive of atelectasis or focal pneumonia. All pertinent images and reports for the current Hospitalization were reviewed by me.     IMPRESSION:    Patient Active Problem List   Diagnosis    Suspected COVID-19 virus infection    Pneumonia    Acute on chronic respiratory failure (HCC)     COVID 19 PNA  Hypoxic resp failure on Vent through Trach  Quadriplegia from C spine injury   PEG tube in place  Lymphopenia   Thrombocytopenia from COVID 19  Left lower lobe consolidation  CRP elevation     Given his resp status will need to cont IV abx and steroids will check Trach aspirate for MDRO      Labs, Microbiology, Radiology and pertinent results from current hospitalization and care every where were reviewed by me as a part of the consultation. PLAN :  1. Cont IV Daptomycin x 10 mg daily   2. D/C IV Unasyn   3. Change to IV Zosyn x 3.375 mg x q 8 hrs  4. Check Trach aspirate  5. Trend CRP and Procal     Discussed with patient/Family and Nursing   Risk of Complications/Morbidity: High      · Illness(es)/ Infection present that pose threat to bodily function. · There is potential for severe exacerbation of infection/side effects of treatment. · Therapy requires intensive monitoring for antimicrobial agent toxicity. Thanks for allowing me to participate in your patient's care please call me with any questions or concerns.     Dr. Quan Rothman MD  [address] [place] [address] Physician  Phone: [phone]   Fax : [phone]

## 2022-01-14 NOTE — PROGRESS NOTES
Medication Reconciliation     List of medications patient is currently taking is complete. Source of information:   1. Conversation with patient and father at bedside  2. EPIC records        Notes regarding home medications:  1. Patient received some of his home medications today PTA. 2. All medications are administered via G-tube    Denies any other OTC/herbal medications.     Robert Maldonado, Pharmacy Intern

## 2022-01-14 NOTE — CARE COORDINATION
INITIAL CASE MANAGEMENT ASSESSMENT    Unable to meet with patient due to isolation status. Patient on ventilator, spoke with patient's father Karl Nicole over the phone to assess possible discharge needs. Explained Case Management role/services. Living Situation: Confirmed address, lives with parents in a 2 story house, 2 steps to enter    ADLs: Independent -- able to walk, dress/bath self, gives own tube feedings; family assists PRN, family completes most homemaking duties     DME: Ventilator, enteral feedings & supplies -- provided by [facility]    PT/OT Recs: Not ordered     Active Services: Respiratory Therapist comes once a month     Transportation: Family transports PRN     Medications: Uses [facility] in [address] -- no issues    PCP: Severo Motts, MD      HD/PD: n/a    PLAN/COMMENTS: Father plans on patient returning home. No anticipated needs. Family transport. CM provided contact information for patient or family to call with any questions. CM will follow and assist as needed.   Electronically signed by Santa Hartman RN Case Management [phone] on 1/14/2022 at 1:25 PM

## 2022-01-14 NOTE — ED PROVIDER NOTES
[address] [place]        Pt Name: Ivonne Mccollum  MRN: [id number]  Armstrongfjoel 1990  Date of evaluation: 1/13/2022  Provider: CARMEN Webb  PCP: No primary care provider on file. Note Started: 8:53 PM EST        I have seen and evaluated this patient with my supervising physician Ashley Fernando MD.    [address] [place] [address]       Chief Complaint   Patient presents with    Positive For Covid-19     having weakness, headache, cough, has a trach. HISTORY OF PRESENT ILLNESS   (Location, Timing/Onset, Context/Setting, Quality, Duration, Modifying Factors, Severity, Associated Signs and Symptoms)  Note limiting factors. Chief Complaint: Concern for Ernestina Khan is a 27 y.o. male who presents to the emergency department today with his father with complaints of concern for COVID. Patient has been feeling unwell since Sunday night, his symptoms include weakness, headache, cough, and just generally feeling unwell. He is vaccinated for COVID, having received it in March and April. He has not yet received his booster. Patient does have a history of having a trach, this has been in place since he was a child as he had a spinal cord injury at birth. He wears the ventilator at night only, generally does not wear it during the daytime hours, unless he is ill. He does have a history of frequent pneumonia. He is not currently taking any antibiotics. He did have a positive COVID exposure at [employer] recently. He has no further complaints at this time. Nursing Notes were all reviewed and agreed with or any disagreements were addressed in the HPI. REVIEW OF SYSTEMS    (2-9 systems for level 4, 10 or more for level 5)     Review of Systems   Constitutional: Positive for chills and fatigue. Negative for fever. HENT: Positive for congestion and sore throat. Respiratory: Positive for cough.  Negative for chest tightness and shortness of breath. Cardiovascular: Negative for chest pain and palpitations. Gastrointestinal: Positive for diarrhea and nausea. Negative for abdominal pain and vomiting. Neurological: Positive for headaches. Negative for dizziness. Positives and Pertinent negatives as per HPI. Except as noted above in the ROS, all other systems were reviewed and negative. PAST MEDICAL HISTORY   No past medical history on file. SURGICAL HISTORY   No past surgical history on file. CURRENTMEDICATIONS       Previous Medications    ACETAMINOPHEN (TYLENOL) 160 MG/5ML LIQUID    Take 20mLs every 4 hours as needed through G-tube    AMPHETAMINE-DEXTROAMPHETAMINE (ADDERALL XR) 20 MG EXTENDED RELEASE CAPSULE    20 mg daily as needed. Through G-tube    BACITRACIN-POLYMYXIN B (POLYSPORIN) 500-50028 UNIT/GM OINTMENT    Apply topically 3 times daily    FLUTICASONE (FLOVENT HFA) 44 MCG/ACT INHALER    Inhale 2 puffs into the lungs daily    FOLIC ACID (FOLVITE) 1 MG TABLET    Take 1 mg by mouth daily    LORATADINE (CLARITIN) 10 MG TABLET    Take 10 mg by mouth daily as needed    NUTRITIONAL SUPPLEMENTS (ISOSOURCE 1.5 SOLITARIO) LIQD    Take 500 mLs by mouth 3 times daily         ALLERGIES     Other    FAMILYHISTORY     No family history on file. SOCIAL HISTORY       Social History     Tobacco Use    Smoking status: Not on file    Smokeless tobacco: Not on file   Substance Use Topics    Alcohol use: Not on file    Drug use: Not on file       SCREENINGS             PHYSICAL EXAM    (up to 7 for level 4, 8 or more for level 5)     ED Triage Vitals [01/13/22 1711]   BP Temp Temp src Pulse Resp SpO2 Height Weight   92/60 98.4 °F (36.9 °C) -- 103 16 98 % -- --       Physical Exam  Vitals and nursing note reviewed. Constitutional:       General: He is not in acute distress. Appearance: Normal appearance. He is well-developed. He is not ill-appearing, toxic-appearing or diaphoretic.    HENT:      Head: Normocephalic and atraumatic. Right Ear: Tympanic membrane and ear canal normal.      Left Ear: Tympanic membrane and ear canal normal.   Eyes:      Conjunctiva/sclera: Conjunctivae normal.      Pupils: Pupils are equal, round, and reactive to light. Cardiovascular:      Rate and Rhythm: Normal rate and regular rhythm. Pulmonary:      Effort: Pulmonary effort is normal. No respiratory distress. Breath sounds: No stridor. No wheezing, rhonchi or rales. Musculoskeletal:      Cervical back: Normal range of motion and neck supple. Skin:     General: Skin is warm and dry. Neurological:      Mental Status: He is alert and oriented to person, place, and time. Psychiatric:         Behavior: Behavior normal. Behavior is cooperative. Thought Content:  Thought content normal.         DIAGNOSTIC RESULTS   LABS:    Labs Reviewed   CBC WITH AUTO DIFFERENTIAL - Abnormal; Notable for the following components:       Result Value    WBC 3.0 (*)     Platelets 276 (*)     Lymphocytes Absolute 0.5 (*)     All other components within normal limits    Narrative:     Performed at:  [address] [employer] [address]   Phone [phone]   COMPREHENSIVE METABOLIC PANEL W/ REFLEX TO MG FOR LOW K - Abnormal; Notable for the following components:    Glucose 102 (*)     BUN 34 (*)     CREATININE 0.7 (*)     ALT 48 (*)     AST 56 (*)     All other components within normal limits    Narrative:     Performed at:  [address] [employer] [address]   Phone [phone]   PROCALCITONIN - Abnormal; Notable for the following components:    Procalcitonin 0.61 (*)     All other components within normal limits    Narrative:     Performed at:  [address] [employer] [address]   Phone [phone], RAPID    Narrative:     Performed at:  [facility] ([facility] - [facility]  [address]  [place] [facility] [address]   Phone [phone]   RAPID INFLUENZA A/B ANTIGENS    Narrative:     Performed at:  [facility]  [address]  [place] [address]   Phone [phone]   CULTURE, BLOOD 1   CULTURE, BLOOD 2   LACTATE, SEPSIS    Narrative:     Performed at:  [facility]  [address] [place] [address]   Phone [phone]   [address], SEPSIS   COVID-19   COVID-19   COVID-19   COVID-19   COVID-19       When ordered only abnormal lab results are displayed. All other labs were within normal range or not returned as of this dictation. EKG: When ordered, EKG's are interpreted by the Emergency Department Physician in the absence of a cardiologist.  Please see their note for interpretation of EKG. RADIOLOGY:   Non-plain film images such as CT, Ultrasound and MRI are read by the radiologist. Plain radiographic images are visualized and preliminarily interpreted by the ED Provider with the below findings:        Interpretation per the Radiologist below, if available at the time of this note:    XR CHEST PORTABLE   Final Result      Focal consolidative change within the left lower lobe. Finding may be   suggestive of atelectasis or focal pneumonia. XR CHEST PORTABLE    Result Date: 1/13/2022  EXAMINATION: ONE XRAY VIEW OF THE CHEST 1/13/2022 6:58 pm COMPARISON: 11/18/2020. HISTORY: ORDERING SYSTEM PROVIDED HISTORY: cough, sob, trach dependent TECHNOLOGIST PROVIDED HISTORY: Reason for exam:->cough, sob, trach dependent Reason for Exam: cough, sob, trach dependent, covid positive FINDINGS: Chest radiograph: The cardiomediastinal silhouette and hilar contours are normal.  Thoracostomy tube noted. Consolidative change within the left lower lobe. The lungs are otherwise clear with no  pleural effusion or pneumothorax.  The overlying soft tissue and osseous structures do not demonstrate acute abnormality. Mild dextroscoliosis of midthoracic spine     Focal consolidative change within the left lower lobe. Finding may be suggestive of atelectasis or focal pneumonia. PROCEDURES   Unless otherwise noted below, none     Procedures    CONSULTS:  IP CONSULT TO HOSPITALIST      EMERGENCY DEPARTMENT COURSE and DIFFERENTIAL DIAGNOSIS/MDM:   Vitals:    Vitals:    01/13/22 2106 01/13/22 2115 01/13/22 2145 01/13/22 2245   BP:  101/66 (!) 106/50 (!) 96/51   Pulse:       Resp:       Temp:       SpO2: 100% 100% 100% 100%       Patient was given the following medications:  Medications   0.9 % sodium chloride infusion (has no administration in time range)   metronidazole (FLAGYL) 500 mg in NaCl 100 mL IVPB premix (has no administration in time range)   0.9 % sodium chloride bolus (0 mLs IntraVENous Stopped 1/13/22 2057)   azithromycin (ZITHROMAX) 500 mg in D5W 250ml addavial (500 mg IntraVENous New Bag 1/13/22 2219)   cefTRIAXone (ROCEPHIN) 1000 mg IVPB in 50 mL D5W minibag (0 mg IntraVENous Stopped 1/13/22 2218)           ED COURSE & MEDICAL DECISION MAKING    - The patient presented to the ER with complaints of concerns for COVID. Vital signs were reviewed. Exam as above. Peripheral IV placed. Labs, Imaging ordered. - Pertinent Labs & Imaging studies reviewed. (See chart for details)   -  Patient seen and evaluated in the emergency department. -  Triage and nursing notes reviewed and incorporated. -  Old chart records reviewed and incorporated.   -   I have seen and evaluated this patient with my supervising physician Medhat Dean MD.  -  Differential diagnosis includes: pulmonary embolism, COPD/asthma, pneumonia, viral URI, nasal congestion, bacterial sinusitis, viral/strep pharyngitis, otitis media, otitis externa  -  Work-up included:  See above  -  ED treatment included:   Rocephin, Zithromax, IV fluids, placement on a ventilator  - Consults: Respiratory therapy for ventilator management. Hospitalist was consulted for admission.  -  Results discussed with patient and/or family. Labs show white blood cell count of 3, no concerning anemia. Metabolic panel with BUN of 34, creatinine 1.7, ALT 48, AST 56. Lactic acid is not elevated at 1.0. Procalcitonin is mildly elevated at 0.61. His flu swab is negative, rapid COVID is negative, PCR test is ordered given a high level of suspicion. Imaging studies show findings of a left lower lobe pneumonia. At this time, we recommend admission, as the patient is trach dependent, with findings consistent with a left lower lobe pneumonia, high suspicion for possible COVID infection. The patient and/or family is agreeable with plan of care and disposition.  -  Disposition:   Admission  - Critical Care: Because of high probability of sudden clinical deterioration of the patient's condition or  further deterioration, critical care time included my full attention to the patient's condition, including chart data review, documentation, medication ordering, reviewing the patient's old records, reevaluation patient's cardiac, pulmonary and neurological status. Reassessment of vital signs. Consultations with ED attending and admitting physician. Ordering, interpreting reviewing diagnostic testing. Therefore, my critical care time was 18 minutes of direct attention to the patient's condition did not include time spent on separately billable procedures. FINAL IMPRESSION      1. Pneumonia of left lower lobe due to infectious organism    2. Tracheostomy dependent ([facility] [address]    3. Exposure to confirmed case of COVID-19          DISPOSITION/PLAN   DISPOSITION Admitted 01/13/2022 11:17:11 PM      PATIENT REFERRED TO:  No follow-up provider specified.     DISCHARGE MEDICATIONS:  New Prescriptions    No medications on file       DISCONTINUED MEDICATIONS:  Discontinued Medications    No medications on file              (Please note that portions of this note

## 2022-01-15 LAB
ANION GAP SERPL CALCULATED.3IONS-SCNC: 16 MMOL/L (ref 3–16)
BASOPHILS ABSOLUTE: 0 K/UL (ref 0–0.2)
BASOPHILS RELATIVE PERCENT: 0.4 %
BUN BLDV-MCNC: 21 MG/DL (ref 7–20)
CALCIUM SERPL-MCNC: 8.3 MG/DL (ref 8.3–10.6)
CHLORIDE BLD-SCNC: 107 MMOL/L (ref 99–110)
CO2: 19 MMOL/L (ref 21–32)
CREAT SERPL-MCNC: 0.6 MG/DL (ref 0.9–1.3)
EOSINOPHILS ABSOLUTE: 0 K/UL (ref 0–0.6)
EOSINOPHILS RELATIVE PERCENT: 0.7 %
GFR AFRICAN AMERICAN: >60
GFR NON-AFRICAN AMERICAN: >60
GLUCOSE BLD-MCNC: 181 MG/DL (ref 70–99)
HCT VFR BLD CALC: 38.5 % (ref 40.5–52.5)
HEMOGLOBIN: 13.3 G/DL (ref 13.5–17.5)
LYMPHOCYTES ABSOLUTE: 0.6 K/UL (ref 1–5.1)
LYMPHOCYTES RELATIVE PERCENT: 8.9 %
MAGNESIUM: 2.1 MG/DL (ref 1.8–2.4)
MCH RBC QN AUTO: 31.5 PG (ref 26–34)
MCHC RBC AUTO-ENTMCNC: 34.6 G/DL (ref 31–36)
MCV RBC AUTO: 91.1 FL (ref 80–100)
MONOCYTES ABSOLUTE: 0.2 K/UL (ref 0–1.3)
MONOCYTES RELATIVE PERCENT: 3.3 %
NEUTROPHILS ABSOLUTE: 6 K/UL (ref 1.7–7.7)
NEUTROPHILS RELATIVE PERCENT: 86.7 %
PDW BLD-RTO: 13.3 % (ref 12.4–15.4)
PLATELET # BLD: 75 K/UL (ref 135–450)
PLATELET SLIDE REVIEW: ABNORMAL
PMV BLD AUTO: 11.5 FL (ref 5–10.5)
POTASSIUM SERPL-SCNC: 3.8 MMOL/L (ref 3.5–5.1)
RBC # BLD: 4.22 M/UL (ref 4.2–5.9)
RBC # BLD: NORMAL 10*6/UL
SLIDE REVIEW: ABNORMAL
SODIUM BLD-SCNC: 142 MMOL/L (ref 136–145)
VACUOLATED NEUTROPHILS: PRESENT
WBC # BLD: 6.9 K/UL (ref 4–11)

## 2022-01-15 PROCEDURE — 2580000003 HC RX 258: Performed by: INTERNAL MEDICINE

## 2022-01-15 PROCEDURE — 80048 BASIC METABOLIC PNL TOTAL CA: CPT

## 2022-01-15 PROCEDURE — 6360000002 HC RX W HCPCS: Performed by: STUDENT IN AN ORGANIZED HEALTH CARE EDUCATION/TRAINING PROGRAM

## 2022-01-15 PROCEDURE — 2000000000 HC ICU R&B

## 2022-01-15 PROCEDURE — 94761 N-INVAS EAR/PLS OXIMETRY MLT: CPT

## 2022-01-15 PROCEDURE — 36415 COLL VENOUS BLD VENIPUNCTURE: CPT

## 2022-01-15 PROCEDURE — 85025 COMPLETE CBC W/AUTO DIFF WBC: CPT

## 2022-01-15 PROCEDURE — 83735 ASSAY OF MAGNESIUM: CPT

## 2022-01-15 PROCEDURE — 6360000002 HC RX W HCPCS: Performed by: INTERNAL MEDICINE

## 2022-01-15 PROCEDURE — 94003 VENT MGMT INPAT SUBQ DAY: CPT

## 2022-01-15 PROCEDURE — 2580000003 HC RX 258: Performed by: STUDENT IN AN ORGANIZED HEALTH CARE EDUCATION/TRAINING PROGRAM

## 2022-01-15 RX ADMIN — Medication 10 ML: at 20:52

## 2022-01-15 RX ADMIN — ENOXAPARIN SODIUM 40 MG: 100 INJECTION SUBCUTANEOUS at 09:06

## 2022-01-15 RX ADMIN — PIPERACILLIN AND TAZOBACTAM 3375 MG: 3; .375 INJECTION, POWDER, LYOPHILIZED, FOR SOLUTION INTRAVENOUS at 20:52

## 2022-01-15 RX ADMIN — PIPERACILLIN AND TAZOBACTAM 3375 MG: 3; .375 INJECTION, POWDER, LYOPHILIZED, FOR SOLUTION INTRAVENOUS at 04:52

## 2022-01-15 RX ADMIN — PIPERACILLIN AND TAZOBACTAM 3375 MG: 3; .375 INJECTION, POWDER, LYOPHILIZED, FOR SOLUTION INTRAVENOUS at 13:16

## 2022-01-15 RX ADMIN — DEXAMETHASONE SODIUM PHOSPHATE 10 MG: 10 INJECTION, SOLUTION INTRAMUSCULAR; INTRAVENOUS at 04:05

## 2022-01-15 ASSESSMENT — PAIN SCALES - GENERAL
PAINLEVEL_OUTOF10: 0

## 2022-01-15 ASSESSMENT — PULMONARY FUNCTION TESTS
PIF_VALUE: 15

## 2022-01-15 NOTE — PROGRESS NOTES
Brief Nutrition Note    Nutrition Recommendations/Plan:   Chronic trach. PEG in place. On isosource at home although will change EN order to reflect the equivalent formula provided here: Bolus 4 (237ml) cans Jevity 1.5 daily    Water flush 90ml before and after each can to meet fluid needs    Estimated Daily Nutrient Needs:  Energy (kcal):  3683-2586 (25-30kcal/49kg); Weight Used for Energy Requirements:  Current     Protein (g):  59-69 (1.2-1.4g/49kg); Weight Used for Protein Requirements:  Current        Fluid (ml/day):  per provider    Current Nutrition Therapies:    Diet NPO  ADULT TUBE FEEDING; Gastrostomy; Other Tube Feeding (specify); isosource 1.5 2cans; Bolus; 4 TIMES DAILY; 480; 30; Before and after each bolus  Current Tube Feeding (TF) Orders:  · Feeding Route: PEG  · Formula: Standard with Fiber  · Schedule: Bolus (Four times daily)  · Water Flushes: 90ml before and after each bolus  · Goal TF & Flush Orders Provides: 4 (237ml) cans of Jevity 1.5 daily provides 948ml TV, 1420 kcals, 60 grams protein, and 720ml free water. Anthropometric Measures:  · Height: 5' (152.4 cm)  · Current Body Weight: 108 lb (49 kg)    · Ideal Body Weight: 106 lbs; % Ideal Body Weight 101.9 %   · BMI: 21.1  · Adjusted Body Weight: 121.5; Quadriplegia   · Adjusted BMI: 23.7    · BMI Categories: Normal Weight (BMI 18.5-24. 9)         Electronically signed by Freddy Aponte RD, LD on 1/15/22 at 9:21 AM EST    Contact: [phone]

## 2022-01-15 NOTE — PROGRESS NOTES
Critical care pefectserved to make aware pt's admission. No new orders at this time.    Electronically signed by Moise Fields RN on 1/15/2022 at 2:42 AM

## 2022-01-15 NOTE — PROGRESS NOTES
Hospitalist Progress Note      PCP: Vasu Gastelum MD    Date of Admission: 1/13/2022        Subjective: Patient seen and examined  Has no complaints today, pleasant, working with RN to increase his activity and sit on the chair. Medications:  Reviewed    Infusion Medications    sodium chloride       Scheduled Medications    sodium chloride flush  5-40 mL IntraVENous 2 times per day    enoxaparin  40 mg SubCUTAneous Daily    dexamethasone  10 mg IntraVENous Q24H    piperacillin-tazobactam  3,375 mg IntraVENous Q8H     PRN Meds: sodium chloride flush, sodium chloride, acetaminophen **OR** acetaminophen, ondansetron, guaiFENesin-dextromethorphan      Intake/Output Summary (Last 24 hours) at 1/15/2022 0911  Last data filed at 1/15/2022 0626  Gross per 24 hour   Intake 2818.49 ml   Output 2550 ml   Net 268.49 ml       Physical Exam Performed:    BP (!) 92/59   Pulse 62   Temp 98.4 °F (36.9 °C) (Axillary)   Resp 24   Ht 5' (1.524 m)   Wt 108 lb 3.9 oz (49.1 kg)   SpO2 100%   BMI 21.14 kg/m²     Gen: Not in distress. Alert. Pleasant  Head: Normocephalic. Atraumatic. Eyes: Conjunctivae/corneas clear. ENT: Oral mucosa moist  Neck: No JVD. No obvious thyromegaly. CVS: Nml S1S2, no murmur  , RRR  Pulmomary: Good air entry in both lung   gastrointestinal: Soft, non tender, non distend, . Feeding tube  Musculoskeletal: Quadriparesis, contracture   neuro: Alert oriented, quadriparesis  Psychiatry: Appropriate affect. Not agitated.       Labs:   Recent Labs     01/13/22 1902 01/14/22 0453 01/15/22  0428   WBC 3.0* 4.3 6.9   HGB 14.2 12.4* 13.3*   HCT 41.4 35.7* 38.5*   * 108* 75*     Recent Labs     01/13/22 1902 01/14/22 0453 01/15/22  0428    138 142   K 3.6 3.7 3.8    104 107   CO2 24 22 19*   BUN 34* 26* 21*   CREATININE 0.7* 0.6* 0.6*   CALCIUM 8.8 7.8* 8.3     Recent Labs     01/13/22 1902   AST 56*   ALT 48*   BILITOT 0.6   ALKPHOS 98     No results for input(s): INR in the last 72 hours. No results for input(s): Sandy Yang in the last 72 hours. Urinalysis:    No results found for: Daproxana Ulrichont, BACTERIA, RBCUA, BLOODU, [address], [address]    Radiology:  XR CHEST PORTABLE   Final Result      Focal consolidative change within the left lower lobe. Finding may be   suggestive of atelectasis or focal pneumonia. Assessment/Plan:             Acute on chronic respiratory failure/central alveolar hypoventilation syndrome. Patient on trach, with vent use at night.     -Continue respiratory support as needed. Pneumonia, likely aspiration and COVID-pneumonia, patient is vaccinated for COVID. -Evaluated by ID team.  Currently on Zosyn. Follow inflammatory marker                 Diet: Diet NPO  ADULT TUBE FEEDING; Gastrostomy; Other Tube Feeding (specify); isosource 1.5 2cans;  Bolus; 4 TIMES DAILY; 480; 30; Before and after each bolus  Code Status: Full Code      Ayah Mota MD

## 2022-01-15 NOTE — PLAN OF CARE
Problem: Airway Clearance - Ineffective  Goal: Achieve or maintain patent airway  Outcome: Ongoing     Problem: Gas Exchange - Impaired  Goal: Absence of hypoxia  Outcome: Ongoing  Goal: Promote optimal lung function  Outcome: Ongoing     Problem: Body Temperature -  Risk of, Imbalanced  Goal: Ability to maintain a body temperature within defined limits  Outcome: Ongoing  Goal: Will regain or maintain usual level of consciousness  Outcome: Ongoing  Goal: Complications related to the disease process, condition or treatment will be avoided or minimized  Outcome: Ongoing     Problem: Isolation Precautions - Risk of Spread of Infection  Goal: Prevent transmission of infection  Outcome: Ongoing     Problem: Nutrition Deficits  Goal: Optimize nutritional status  Outcome: Ongoing     Problem: Risk for Fluid Volume Deficit  Goal: Maintain normal heart rhythm  Outcome: Ongoing  Goal: Maintain absence of muscle cramping  Outcome: Ongoing  Goal: Maintain normal serum potassium, sodium, calcium, phosphorus, and pH  Outcome: Ongoing     Problem: Loneliness or Risk for Loneliness  Goal: Demonstrate positive use of time alone when socialization is not possible  Outcome: Ongoing     Problem: Fatigue  Goal: Verbalize increase energy and improved vitality  Outcome: Ongoing     Problem: Patient Education: Go to Patient Education Activity  Goal: Patient/Family Education  Outcome: Ongoing     Problem: Falls - Risk of:  Goal: Will remain free from falls  Description: Will remain free from falls  Outcome: Ongoing  Note: Falling star program remains in place. Call light and personal belongings within reach. Frequent visual monitoring continues. Toileting program inplace. Patient assisted in turning/repositioning at least once every 2 hours, and on a prn basis.        Problem: Skin Integrity:  Goal: Will show no infection signs and symptoms  Description: Will show no infection signs and symptoms  Outcome: Ongoing  Note: Monitoring patient skin integrity for skin breakdown, turning and repositioning q2h per protocol. Patient demonstrates turning and repositioning self.         Problem: Nutrition  Goal: Optimal nutrition therapy  1/15/2022 0435 by Mark Styles RN  Outcome: Ongoing  1/14/2022 1546 by Xena Santo RD, LD  Outcome: Ongoing

## 2022-01-15 NOTE — PROGRESS NOTES
Patient transferred to ICU Rm accompanied by 2 RN and RT. Bedside report given. Tele box returned to [address] [place] [address].

## 2022-01-16 ENCOUNTER — APPOINTMENT (OUTPATIENT)
Dept: GENERAL RADIOLOGY | Age: 32
DRG: 208 | End: 2022-01-16
Payer: COMMERCIAL

## 2022-01-16 PROBLEM — U07.1 COVID-19 VIRUS INFECTION: Status: ACTIVE | Noted: 2022-01-13

## 2022-01-16 LAB
ANION GAP SERPL CALCULATED.3IONS-SCNC: 13 MMOL/L (ref 3–16)
BASOPHILS ABSOLUTE: 0 K/UL (ref 0–0.2)
BASOPHILS RELATIVE PERCENT: 0.2 %
BUN BLDV-MCNC: 20 MG/DL (ref 7–20)
CALCIUM SERPL-MCNC: 8.6 MG/DL (ref 8.3–10.6)
CHLORIDE BLD-SCNC: 105 MMOL/L (ref 99–110)
CO2: 24 MMOL/L (ref 21–32)
CREAT SERPL-MCNC: 0.5 MG/DL (ref 0.9–1.3)
EOSINOPHILS ABSOLUTE: 0 K/UL (ref 0–0.6)
EOSINOPHILS RELATIVE PERCENT: 0 %
GFR AFRICAN AMERICAN: >60
GFR NON-AFRICAN AMERICAN: >60
GLUCOSE BLD-MCNC: 162 MG/DL (ref 70–99)
HCT VFR BLD CALC: 40.1 % (ref 40.5–52.5)
HEMOGLOBIN: 13.8 G/DL (ref 13.5–17.5)
LYMPHOCYTES ABSOLUTE: 0.7 K/UL (ref 1–5.1)
LYMPHOCYTES RELATIVE PERCENT: 6.2 %
MAGNESIUM: 2.1 MG/DL (ref 1.8–2.4)
MCH RBC QN AUTO: 31.3 PG (ref 26–34)
MCHC RBC AUTO-ENTMCNC: 34.4 G/DL (ref 31–36)
MCV RBC AUTO: 91 FL (ref 80–100)
MONOCYTES ABSOLUTE: 0.7 K/UL (ref 0–1.3)
MONOCYTES RELATIVE PERCENT: 6.3 %
NEUTROPHILS ABSOLUTE: 9.5 K/UL (ref 1.7–7.7)
NEUTROPHILS RELATIVE PERCENT: 87.3 %
PDW BLD-RTO: 13.5 % (ref 12.4–15.4)
PLATELET # BLD: 128 K/UL (ref 135–450)
PLATELET SLIDE REVIEW: ABNORMAL
PMV BLD AUTO: 10.1 FL (ref 5–10.5)
POTASSIUM SERPL-SCNC: 4.1 MMOL/L (ref 3.5–5.1)
RBC # BLD: 4.4 M/UL (ref 4.2–5.9)
RBC # BLD: NORMAL 10*6/UL
SLIDE REVIEW: ABNORMAL
SODIUM BLD-SCNC: 142 MMOL/L (ref 136–145)
WBC # BLD: 10.9 K/UL (ref 4–11)

## 2022-01-16 PROCEDURE — 36415 COLL VENOUS BLD VENIPUNCTURE: CPT

## 2022-01-16 PROCEDURE — 80048 BASIC METABOLIC PNL TOTAL CA: CPT

## 2022-01-16 PROCEDURE — 51798 US URINE CAPACITY MEASURE: CPT

## 2022-01-16 PROCEDURE — 6360000002 HC RX W HCPCS: Performed by: INTERNAL MEDICINE

## 2022-01-16 PROCEDURE — 6360000002 HC RX W HCPCS: Performed by: STUDENT IN AN ORGANIZED HEALTH CARE EDUCATION/TRAINING PROGRAM

## 2022-01-16 PROCEDURE — 2060000000 HC ICU INTERMEDIATE R&B

## 2022-01-16 PROCEDURE — 71045 X-RAY EXAM CHEST 1 VIEW: CPT

## 2022-01-16 PROCEDURE — 94003 VENT MGMT INPAT SUBQ DAY: CPT

## 2022-01-16 PROCEDURE — 1200000000 HC SEMI PRIVATE

## 2022-01-16 PROCEDURE — 87186 SC STD MICRODIL/AGAR DIL: CPT

## 2022-01-16 PROCEDURE — 85025 COMPLETE CBC W/AUTO DIFF WBC: CPT

## 2022-01-16 PROCEDURE — 51701 INSERT BLADDER CATHETER: CPT

## 2022-01-16 PROCEDURE — 94761 N-INVAS EAR/PLS OXIMETRY MLT: CPT

## 2022-01-16 PROCEDURE — 2580000003 HC RX 258: Performed by: INTERNAL MEDICINE

## 2022-01-16 PROCEDURE — 83735 ASSAY OF MAGNESIUM: CPT

## 2022-01-16 PROCEDURE — 89220 SPUTUM SPECIMEN COLLECTION: CPT

## 2022-01-16 PROCEDURE — 87077 CULTURE AEROBIC IDENTIFY: CPT

## 2022-01-16 PROCEDURE — 87205 SMEAR GRAM STAIN: CPT

## 2022-01-16 PROCEDURE — 87070 CULTURE OTHR SPECIMN AEROBIC: CPT

## 2022-01-16 PROCEDURE — 2580000003 HC RX 258: Performed by: STUDENT IN AN ORGANIZED HEALTH CARE EDUCATION/TRAINING PROGRAM

## 2022-01-16 RX ORDER — ALBUTEROL SULFATE 90 UG/1
2 AEROSOL, METERED RESPIRATORY (INHALATION) EVERY 6 HOURS PRN
Status: DISCONTINUED | OUTPATIENT
Start: 2022-01-16 | End: 2022-01-18 | Stop reason: HOSPADM

## 2022-01-16 RX ADMIN — PIPERACILLIN AND TAZOBACTAM 3375 MG: 3; .375 INJECTION, POWDER, LYOPHILIZED, FOR SOLUTION INTRAVENOUS at 05:00

## 2022-01-16 RX ADMIN — DEXAMETHASONE SODIUM PHOSPHATE 10 MG: 10 INJECTION, SOLUTION INTRAMUSCULAR; INTRAVENOUS at 03:24

## 2022-01-16 RX ADMIN — PIPERACILLIN AND TAZOBACTAM 3375 MG: 3; .375 INJECTION, POWDER, LYOPHILIZED, FOR SOLUTION INTRAVENOUS at 21:05

## 2022-01-16 RX ADMIN — Medication 10 ML: at 10:49

## 2022-01-16 RX ADMIN — PIPERACILLIN AND TAZOBACTAM 3375 MG: 3; .375 INJECTION, POWDER, LYOPHILIZED, FOR SOLUTION INTRAVENOUS at 12:53

## 2022-01-16 RX ADMIN — ENOXAPARIN SODIUM 40 MG: 100 INJECTION SUBCUTANEOUS at 10:48

## 2022-01-16 ASSESSMENT — PULMONARY FUNCTION TESTS
PIF_VALUE: 22
PIF_VALUE: 15

## 2022-01-16 ASSESSMENT — PAIN SCALES - GENERAL
PAINLEVEL_OUTOF10: 0

## 2022-01-16 NOTE — PROGRESS NOTES
Attempted to straight catheterize patient first w/ 14 FR catheter per sterile technique and was unsuccessful, meeting resistance and unable to pass. Then tried again w/ 14 FR Coude, again unsuccessful. Patient states he has success only w/ his catheters from home, which he has here at hospital, stating they are stiffer and able to pass the resistance. States to re-attempt in a couple hours when he feels he needs to be emptied. Patient is transferring to PCU bed 5255, room is currently being cleaned. Belongings packed up and report called to PCU RN. Transport will be called when new room is ready. Will continue to monitor.     Electronically signed by Sarah Baltazar RN on 1/16/2022 at 3:32 PM

## 2022-01-16 NOTE — PROGRESS NOTES
4 Eyes Admission Assessment     I agree as the admission nurse that 2 RN's have performed a thorough Head to Toe Skin Assessment on the patient. ALL assessment sites listed below have been assessed on admission. Areas assessed by both nurses:   [x]   Head, Face, and Ears   [x]   Shoulders, Back, and Chest  [x]   Arms, Elbows, and Hands   [x]   Coccyx, Sacrum, and Ischum  [x]   Legs, Feet, and Heels        Does the Patient have Skin Breakdown?   No         Juventino Prevention initiated:  NA   Wound Care Orders initiated:  NA      WOC nurse consulted for Pressure Injury (Stage 3,4, Unstageable, DTI, NWPT, and Complex wounds):  NA      Nurse 1 eSignature: Electronically signed by Cass Rubin RN on 1/16/22 at 4:44 PM EST    **SHARE this note so that the co-signing nurse is able to place an eSignature**    Nurse 2 eSignature: Electronically signed by Berenice Victor RN on 1/16/22 at 7:11 PM EST

## 2022-01-16 NOTE — PROGRESS NOTES
Hospitalist Progress Note      PCP: Regla Christy MD    Date of Admission: 1/13/2022        Subjective:     Patient seen and examined today  No major overnight events  Tracheostomy  Report more shortness of breath \"I feel the pneumonia\" in the left side. No worsening hypoxia noted. Medications:  Reviewed    Infusion Medications    sodium chloride       Scheduled Medications    sodium chloride flush  5-40 mL IntraVENous 2 times per day    enoxaparin  40 mg SubCUTAneous Daily    dexamethasone  10 mg IntraVENous Q24H    piperacillin-tazobactam  3,375 mg IntraVENous Q8H     PRN Meds: albuterol sulfate HFA, sodium chloride flush, sodium chloride, acetaminophen **OR** acetaminophen, ondansetron, guaiFENesin-dextromethorphan      Intake/Output Summary (Last 24 hours) at 1/16/2022 1237  Last data filed at 1/16/2022 0615  Gross per 24 hour   Intake 866.86 ml   Output 1410 ml   Net -543.14 ml       Physical Exam Performed:    /66   Pulse 67   Temp 97.8 °F (36.6 °C) (Axillary)   Resp 16   Ht 5' (1.524 m)   Wt 108 lb 7.5 oz (49.2 kg)   SpO2 99%   BMI 21.18 kg/m²   unhange physical finding from 1/15/2022 as below  Gen: Not in distress. Alert. Pleasant  Head: Normocephalic. Atraumatic. Eyes: Conjunctivae/corneas clear. ENT: Oral mucosa moist  Neck: No JVD. No obvious thyromegaly. CVS: Nml S1S2, no murmur  , RRR  Pulmomary: Good air entry in both lung   gastrointestinal: Soft, non tender, non distend, . Feeding tube  Musculoskeletal: Quadriparesis, contracture   neuro: Alert oriented, quadriparesis  Psychiatry: Appropriate affect. Not agitated.       Labs:   Recent Labs     01/14/22  0453 01/15/22  0428 01/16/22  0425   WBC 4.3 6.9 10.9   HGB 12.4* 13.3* 13.8   HCT 35.7* 38.5* 40.1*   * 75* 128*     Recent Labs     01/14/22  0453 01/15/22  0428 01/16/22 0425    142 142   K 3.7 3.8 4.1    107 105   CO2 22 19* 24   BUN 26* 21* 20   CREATININE 0.6* 0.6* 0.5*   CALCIUM 7.8* 8.3 8.6 Recent Labs     01/13/22  1902   AST 56*   ALT 48*   BILITOT 0.6   ALKPHOS 98     No results for input(s): INR in the last 72 hours. No results for input(s): Bennetta Downy in the last 72 hours. Urinalysis:    No results found for: Snehal Clas, BACTERIA, RBCUA, BLOODU, [address], [address]    Radiology:  XR CHEST PORTABLE   Final Result   No significant finding in the chest.         XR CHEST PORTABLE   Final Result      Focal consolidative change within the left lower lobe. Finding may be   suggestive of atelectasis or focal pneumonia. Assessment/Plan:             Acute on chronic respiratory failure/central alveolar hypoventilation syndrome.  -Back to baseline now Continue monitoring, respiratory care protocol, and albuterol. Pneumonia, likely aspiration and COVID-pneumonia, patient is vaccinated for COVID. -Continue IV Zosyn, repeat chest x-ray showed no changes. Seen by ID. Elevation of procalcitonin noted.         Diet: Diet NPO  ADULT TUBE FEEDING; PEG; Standard with Fiber; Bolus; 4 TIMES DAILY; 237; Gravity; 90; Before and after each bolus  Code Status: Full Code      Socorro Mackey MD

## 2022-01-16 NOTE — PROGRESS NOTES
Pt had an uneventful day. Was able to come off ventilator for majority of day on room air and was up to the chair. Able to converse and breathe easy.

## 2022-01-16 NOTE — PROGRESS NOTES
RT at bedside and pt is back to vent.    Electronically signed by Lona Carter RN on 1/16/2022 at 12:21 AM

## 2022-01-17 LAB
ANION GAP SERPL CALCULATED.3IONS-SCNC: 16 MMOL/L (ref 3–16)
BASOPHILS ABSOLUTE: 0 K/UL (ref 0–0.2)
BASOPHILS RELATIVE PERCENT: 0.1 %
BLOOD CULTURE, ROUTINE: NORMAL
BUN BLDV-MCNC: 28 MG/DL (ref 7–20)
C-REACTIVE PROTEIN: 10.2 MG/L (ref 0–5.1)
CALCIUM SERPL-MCNC: 8.6 MG/DL (ref 8.3–10.6)
CHLORIDE BLD-SCNC: 103 MMOL/L (ref 99–110)
CO2: 20 MMOL/L (ref 21–32)
CREAT SERPL-MCNC: 0.6 MG/DL (ref 0.9–1.3)
EOSINOPHILS ABSOLUTE: 0 K/UL (ref 0–0.6)
EOSINOPHILS RELATIVE PERCENT: 0 %
GFR AFRICAN AMERICAN: >60
GFR NON-AFRICAN AMERICAN: >60
GLUCOSE BLD-MCNC: 105 MG/DL (ref 70–99)
HCT VFR BLD CALC: 40.7 % (ref 40.5–52.5)
HEMOGLOBIN: 14 G/DL (ref 13.5–17.5)
LYMPHOCYTES ABSOLUTE: 0.5 K/UL (ref 1–5.1)
LYMPHOCYTES RELATIVE PERCENT: 5.1 %
MAGNESIUM: 2 MG/DL (ref 1.8–2.4)
MCH RBC QN AUTO: 31.2 PG (ref 26–34)
MCHC RBC AUTO-ENTMCNC: 34.4 G/DL (ref 31–36)
MCV RBC AUTO: 90.5 FL (ref 80–100)
MONOCYTES ABSOLUTE: 0.2 K/UL (ref 0–1.3)
MONOCYTES RELATIVE PERCENT: 2.5 %
NEUTROPHILS ABSOLUTE: 8.2 K/UL (ref 1.7–7.7)
NEUTROPHILS RELATIVE PERCENT: 92.3 %
PDW BLD-RTO: 13.4 % (ref 12.4–15.4)
PLATELET # BLD: 133 K/UL (ref 135–450)
PLATELET SLIDE REVIEW: ABNORMAL
PMV BLD AUTO: 10.4 FL (ref 5–10.5)
POTASSIUM SERPL-SCNC: 4.1 MMOL/L (ref 3.5–5.1)
RBC # BLD: 4.49 M/UL (ref 4.2–5.9)
SLIDE REVIEW: ABNORMAL
SODIUM BLD-SCNC: 139 MMOL/L (ref 136–145)
WBC # BLD: 8.9 K/UL (ref 4–11)

## 2022-01-17 PROCEDURE — 2060000000 HC ICU INTERMEDIATE R&B

## 2022-01-17 PROCEDURE — 86140 C-REACTIVE PROTEIN: CPT

## 2022-01-17 PROCEDURE — 51701 INSERT BLADDER CATHETER: CPT

## 2022-01-17 PROCEDURE — 51798 US URINE CAPACITY MEASURE: CPT

## 2022-01-17 PROCEDURE — 94761 N-INVAS EAR/PLS OXIMETRY MLT: CPT

## 2022-01-17 PROCEDURE — 85025 COMPLETE CBC W/AUTO DIFF WBC: CPT

## 2022-01-17 PROCEDURE — 80048 BASIC METABOLIC PNL TOTAL CA: CPT

## 2022-01-17 PROCEDURE — 6360000002 HC RX W HCPCS: Performed by: INTERNAL MEDICINE

## 2022-01-17 PROCEDURE — 2580000003 HC RX 258: Performed by: STUDENT IN AN ORGANIZED HEALTH CARE EDUCATION/TRAINING PROGRAM

## 2022-01-17 PROCEDURE — 6370000000 HC RX 637 (ALT 250 FOR IP): Performed by: HOSPITALIST

## 2022-01-17 PROCEDURE — 36415 COLL VENOUS BLD VENIPUNCTURE: CPT

## 2022-01-17 PROCEDURE — 6360000002 HC RX W HCPCS: Performed by: STUDENT IN AN ORGANIZED HEALTH CARE EDUCATION/TRAINING PROGRAM

## 2022-01-17 PROCEDURE — 99232 SBSQ HOSP IP/OBS MODERATE 35: CPT | Performed by: INTERNAL MEDICINE

## 2022-01-17 PROCEDURE — 2580000003 HC RX 258: Performed by: INTERNAL MEDICINE

## 2022-01-17 PROCEDURE — 83735 ASSAY OF MAGNESIUM: CPT

## 2022-01-17 RX ORDER — LOPERAMIDE HCL 1 MG/7.5ML
2 SOLUTION ORAL 4 TIMES DAILY PRN
Status: DISCONTINUED | OUTPATIENT
Start: 2022-01-17 | End: 2022-01-18 | Stop reason: HOSPADM

## 2022-01-17 RX ORDER — DEXAMETHASONE SODIUM PHOSPHATE 10 MG/ML
6 INJECTION, SOLUTION INTRAMUSCULAR; INTRAVENOUS EVERY 24 HOURS
Status: DISCONTINUED | OUTPATIENT
Start: 2022-01-18 | End: 2022-01-18 | Stop reason: HOSPADM

## 2022-01-17 RX ADMIN — PIPERACILLIN AND TAZOBACTAM 3375 MG: 3; .375 INJECTION, POWDER, LYOPHILIZED, FOR SOLUTION INTRAVENOUS at 20:49

## 2022-01-17 RX ADMIN — SODIUM CHLORIDE 25 ML: 9 INJECTION, SOLUTION INTRAVENOUS at 20:49

## 2022-01-17 RX ADMIN — DEXAMETHASONE SODIUM PHOSPHATE 10 MG: 10 INJECTION, SOLUTION INTRAMUSCULAR; INTRAVENOUS at 02:52

## 2022-01-17 RX ADMIN — PIPERACILLIN AND TAZOBACTAM 3375 MG: 3; .375 INJECTION, POWDER, LYOPHILIZED, FOR SOLUTION INTRAVENOUS at 12:35

## 2022-01-17 RX ADMIN — LOPERAMIDE HYDROCHLORIDE 2 MG: 2 SOLUTION ORAL at 19:55

## 2022-01-17 RX ADMIN — ENOXAPARIN SODIUM 40 MG: 100 INJECTION SUBCUTANEOUS at 09:35

## 2022-01-17 RX ADMIN — Medication 10 ML: at 09:35

## 2022-01-17 RX ADMIN — PIPERACILLIN AND TAZOBACTAM 3375 MG: 3; .375 INJECTION, POWDER, LYOPHILIZED, FOR SOLUTION INTRAVENOUS at 05:15

## 2022-01-17 RX ADMIN — Medication 10 ML: at 20:45

## 2022-01-17 ASSESSMENT — PAIN SCALES - GENERAL
PAINLEVEL_OUTOF10: 0

## 2022-01-17 ASSESSMENT — PULMONARY FUNCTION TESTS
PIF_VALUE: 24
PIF_VALUE: 24
PIF_VALUE: 31

## 2022-01-17 NOTE — PROGRESS NOTES
TRACH CARE:  Pt removed from ventilator; placed on room air tolerated well; suction for scant; trach care completed replaced drain sponge; replaced trach collar; both old collar and drain sponge only slightly soiled.  Electronically signed by Lorena Alvarez RCP on 1/17/2022 at 8:33 AM

## 2022-01-17 NOTE — PROGRESS NOTES
Hospitalist Progress Note      PCP: Cornelio Antoine MD    Date of Admission: 1/13/2022        Subjective:        Patient seen and examined  Shortness of breath improving  No major overnight events        Medications:  Reviewed    Infusion Medications    sodium chloride       Scheduled Medications    [START ON 1/18/2022] dexamethasone  6 mg IntraVENous Q24H    sodium chloride flush  5-40 mL IntraVENous 2 times per day    enoxaparin  40 mg SubCUTAneous Daily    piperacillin-tazobactam  3,375 mg IntraVENous Q8H     PRN Meds: albuterol sulfate HFA, sodium chloride flush, sodium chloride, acetaminophen **OR** acetaminophen, ondansetron, guaiFENesin-dextromethorphan      Intake/Output Summary (Last 24 hours) at 1/17/2022 1348  Last data filed at 1/17/2022 1302  Gross per 24 hour   Intake 1565.11 ml   Output 1750 ml   Net -184.89 ml       Physical Exam Performed:    /64   Pulse 61   Temp 97.7 °F (36.5 °C) (Oral)   Resp 20   Ht 5' (1.524 m)   Wt 104 lb 8 oz (47.4 kg)   SpO2 98%   BMI 20.41 kg/m²       Gen: Not in distress. Alert. Head: Normocephalic. Atraumatic. Eyes: Conjunctivae/corneas clear. ENT: Oral mucosa moist  Neck: Tracheostomy in place  CVS: Nml S1S2, no murmur  , RRR  Pulmomary: Reduced air entry  Gastrointestinal: Soft, non tender, non distend, . Musculoskeletal: No edema. Warm  Neuro: Alert oriented, quadriparesis   psychiatry: Appropriate affect. Not agitated. Labs:   Recent Labs     01/15/22  0428 01/16/22  0425 01/17/22  0629   WBC 6.9 10.9 8.9   HGB 13.3* 13.8 14.0   HCT 38.5* 40.1* 40.7   PLT 75* 128* 133*     Recent Labs     01/15/22  0428 01/16/22  0425 01/17/22  0629    142 139   K 3.8 4.1 4.1    105 103   CO2 19* 24 20*   BUN 21* 20 28*   CREATININE 0.6* 0.5* 0.6*   CALCIUM 8.3 8.6 8.6     No results for input(s): AST, ALT, BILIDIR, BILITOT, ALKPHOS in the last 72 hours. No results for input(s): INR in the last 72 hours.   No results for input(s): CKTOTAL, TROPONINI in the last 72 hours. Urinalysis:    No results found for: Rubén Henle, BACTERIA, RBCUA, BLOODU, [address], [address]    Radiology:  XR CHEST PORTABLE   Final Result   No significant finding in the chest.         XR CHEST PORTABLE   Final Result      Focal consolidative change within the left lower lobe. Finding may be   suggestive of atelectasis or focal pneumonia. Assessment/Plan:             Acute on chronic respiratory failure/central alveolar hypoventilation syndrome.    -Continue respiratory care protocol, bronchodilator  -Continue vent at night as at home       Pneumonia, likely aspiration , less likely COVID-pneumonia, patient is vaccinated for COVID. -Procalcitonin elevated  -Continue IV Zosyn  -Respiratory culture, awaiting speciation , Blood culture remain negative  -Continue Decadron for 10 days. Dysphagia  Stated that he is able to drink water, currently n.p.o. Continue tube. Swallow evaluation      Diet: Diet NPO  ADULT TUBE FEEDING; PEG; Standard with Fiber; Bolus; 4 TIMES DAILY; 237; Gravity; 90; Before and after each bolus  Code Status: Full Code    Disposition:, Hopefully discharge home tomorrow, lives with parents. ?   Karmen Claude, MD

## 2022-01-17 NOTE — PROGRESS NOTES
Speech Language Pathology     Speech Therapy note regarding SLP Evaluation and treatment orders    · Met with pt briefly who questions rationale of assessment  · Pt reports has a feeding tube and primarily gets all nutrition there  · Pt reports every once in a while may drink some water or ensure something like that  · Pt report sometimes I go many days without anything by mouth and only by PEG tube  · Pt allowed this SLP to confirm with parent. · SLP talked with pt's father who also questioned by Speech therapy  · SLP review MD note and anticipation of swallow eval.  · Pt's Parent reports pt gets nutrition via PEG tube  · Pt's parent reported that in a typical day pt may not have anything by mouth. In a typical week may have a bottle of water or Gatorade. Otherwise not interested. Parent reports he had a Barium Swallow 30 years or so ago. Plan: Eval held per pt request. Will get clarification of order     15 minutes non-billable time    [facility] TRACI Carter,MS,CCC,SLP [id number]  Speech and Language Pathologist  1/17/2022 1507 pm

## 2022-01-17 NOTE — ADT AUTH CERT
Utilization Reviews         Viral Illness, Acute - Care Day 4 (1/16/2022) by Jack Orozco RN       Review Status Review Entered   Completed 1/17/2022 09:34      Criteria Review      Care Day: 4 Care Date: 1/16/2022 Level of Care: Intermediate Care    Guideline Day 2    Clinical Status    (X) * Hypotension absent    ( ) * No requirement for mechanical ventilation    1/17/2022 9:34 AM EST by Rahul Tran    ( ) * Oxygenation at baseline or improved    1/17/2022 9:34 AM EST by Kimi Villarreal      FIO2 30    (X) * Mental status at baseline    Routes    ( ) * Oral hydration    (X) Oral or IV medications    1/17/2022 9:34 AM EST by Kimi Villarreal      dexamethasone 10 [id number]    (X) Usual diet    1/17/2022 9:34 AM EST by Kimi Villarreal      PEG; Standard with Fiber; Bolus; 4 TIMES DAILY; 237; Gravity; 90; Before and after each bolus    Interventions    (X) Possible isolation    1/17/2022 9:34 AM EST by Kimi Vilalrreal      droplet plus    (X) Pulse oximetry    (X) Possible oxygen    1/17/2022 9:34 AM EST by Kimi Villarreal      FIO2 30    Medications    (X) Possible antibiotic (eg, for bacterial coinfection or superinfection)    1/17/2022 9:34 AM EST by Kimi Villarreal      piperacillin-tazobactam 3,375 [address] [place] [address]    (X) Possible DVT prophylaxis    1/17/2022 9:34 AM EST by Kimi Villarreal      enoxaparin 40 mgSubCUTAneousDaily    * Milestone   Additional Notes   DATE: 1/16/22 transfer to PCU      Vitals:   /66   Pulse 67   Temp 97.8 °F (36.6 °C) (Axillary)   Resp 24 SpO2 99%           Abnl/Pertinent Labs/Radiology/Diagnostic Studies:   Chest XR    FINDINGS: A tracheostomy tube is present.  Heart, mediastinum and pulmonary vascularity are normal.  The lungs appear clear.  No significant skeletal finding.           Orders:      Mechanical ventilation    [facility]      Comments: Q6 hours if greater than 400   Bladder scan  EVERY 6 HOURS      Pulse oximetry, continuous   Daily labs   Strict bedrest   Telemetry      MD Consults/Assessments & Plans:   Per IM    Assessment/Plan:         Acute on chronic respiratory failure/central alveolar hypoventilation syndrome.   -Back to baseline now Continue monitoring, respiratory care protocol, and albuterol.            Pneumonia, likely aspiration and COVID-pneumonia, patient is vaccinated for COVID.       -Continue IV Zosyn, repeat chest x-ray showed no changes.  Seen by ID.  Elevation of procalcitonin noted.                 Viral Illness, Acute - Care Day 2 (1/14/2022) by Ernestina Khan RN       Review Status Review Entered   Completed 1/17/2022 09:23      Criteria Review      Care Day: 2 Care Date: 1/14/2022 Level of Care: Intermediate Care    Guideline Day 2    Clinical Status    (X) * Hypotension absent    ( ) * No requirement for mechanical ventilation    1/17/2022 9:23 AM EST by Dain An    ( ) * Oxygenation at baseline or improved    (X) * Mental status at baseline    Routes    ( ) * Oral hydration    1/17/2022 9:23 AM EST by [facility]      NPO    (X) Oral or IV medications    1/17/2022 9:23 AM EST by [facility]      dexamethasone 10 [id number]    (X) Usual diet    1/17/2022 9:23 AM EST by [facility]      ADULT TUBE FEEDING; Gastrostomy; Other Tube Feeding (specify); isosource 1.5 2cans;  Bolus; 4 TIMES DAILY; 240; 30; Before and after each bolus    Interventions    (X) Possible isolation    (X) Pulse oximetry    (X) Possible oxygen    1/17/2022 9:23 AM EST by [facility]      FIO2 30    Medications    (X) Possible antibiotic (eg, for bacterial coinfection or superinfection)    1/17/2022 9:23 AM EST by Rolin Bloodgood      IV Zosyn x 3.375 mg x q 8 hrs    (X) Possible DVT prophylaxis    1/17/2022 9:23 AM EST by [facility]      enoxaparin 40 mgSubCUTAneousDaily    * Milestone   Additional Notes   DATE: 1/14/22 ICU      Vitals:   /70   Pulse 72   Temp 98 °F (36.7 °C) (Oral)   Resp 18   Wt 121 lb 4.1 oz (55 kg)   SpO2 100%          Abnl/Pertinent Labs/Radiology/Diagnostic Studies:      Sodium: 138   Potassium: 3.7   Chloride: 104   CO2: 22   BUN: 26 (H)   Creatinine: 0.6 (L)   Anion Gap: 12   GFR Non-: >60   GFR African American: >60   Magnesium: 1.90   Glucose: 109 (H)   CALCIUM, SERUM, 512394: 7.8 (L)   Procalcitonin: 0.50 (H)   CRP: 67.0 (H)   WBC: 4.3   RBC: 3.97 (L)   Hemoglobin Quant: 12.4 (L)   Hematocrit: 35.7 (L)      Orders:   [facility]      Comments: Q6 hours if greater than 400   Bladder scan  EVERY 6 HOURS      Pulse oximetry, continuous   Daily labs   Strict bedrest   Telemetry         MD Consults/Assessments & Plans:      Per ID   IMPRESSION:     Patient Active Problem List   Diagnosis   · Suspected COVID-19 virus infection   · Pneumonia   · Acute on chronic respiratory failure (HCC)       COVID 19 PNA   Hypoxic resp failure on Vent through Trach   Quadriplegia from C spine injury    PEG tube in place   Lymphopenia    Thrombocytopenia from COVID 19   Left lower lobe consolidation   CRP elevation        Given his resp status will need to cont IV abx and steroids will check Trach aspirate for MDRO            PLAN :   1. Cont IV Daptomycin x 10 mg daily    2. D/C IV Unasyn    3. Change to IV Zosyn x 3.375 mg x q 8 hrs   4. Check Trach aspirate   5. Trend CRP and Procal       Per IM   1.  Acute on chronic respiratory failure patient on trach, supportive measures, keep saturation above 90%, will consult ID for further recommendations.    2.  Pneumonia, likely aspiration and COVID-pneumonia, patient was started on Unasyn and azithromycin at this time pending ID recommendations also patient was started on Decadron   3.  COVID-19 infection as above   4.  History of quadriplegia   5.  Status post PEG      Code Status: Full Code

## 2022-01-17 NOTE — PROGRESS NOTES
Infectious Disease Follow up Notes  Admit Date: 1/13/2022  Hospital Day: 5    Antibiotics :   IV Zosyn stopped   Dexamethasone    CHIEF COMPLAINT:     COVID 19 PNA  Resp failure  Chronic Trach    Subjective interval History :  27 y.o. 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 Tested +Ve for COVID 19 , Procal 0.61  And CRP 67, mild Leukopenia, and thrombocytopenia, rapid flu negative and Blood cx in process. CXR with Left lower lobe consolidation - We are consulted for IV abx management - No history from patient due to Ventilated status through Trach-     Interval History : off the vent on chronic Trach able to vocalize - tolerating the meds ok and no nausea no vomiting       Past Medical History:    Quadriplegia  Trach   PEG         Past Surgical History:    Trach   PEG    Current Medications:    Outpatient Medications Marked as Taking for the 1/13/22 encounter [facility] Encounter)   Medication Sig Dispense Refill    acetaminophen (TYLENOL) 160 MG/5ML liquid Take 20mLs every 4 hours as needed through G-tube      bacitracin-polymyxin b (POLYSPORIN) 500-00091 UNIT/GM ointment Apply topically 3 times daily      fluticasone (FLOVENT HFA) 44 MCG/ACT inhaler Inhale 2 puffs into the lungs daily      folic acid (FOLVITE) 1 MG tablet Take 1 mg by mouth daily      Nutritional Supplements (ISOSOURCE 1.5 SOLITARIO) LIQD Take 500 mLs by mouth 3 times daily         Allergies: Other    Immunizations : There is no immunization history on file for this patient.     Social History:     Social History     Tobacco Use    Smoking status: Not on file    Smokeless tobacco: Not on file   Substance Use Topics    Alcohol use: Not on file    Drug use: Not on file     Social History     Tobacco Use   Smoking Status Not on file Smokeless Tobacco Not on file          REVIEW OF SYSTEMS:    Not possible due to tracheostomy .                 PHYSICAL EXAM:      Vitals:    /79   Pulse 61   Temp 97.7 °F (36.5 °C) (Oral)   Resp 20   Ht 5' (1.524 m)   Wt 104 lb 8 oz (47.4 kg)   SpO2 100%   BMI 20.41 kg/m²     General Appearance: AWAKE  onTrach+  and in some acute distress, ++ pallor, no icterus   Skin: warm and dry, no rash or erythema  Head: normocephalic and atraumatic  Eyes: pupils equal, round, and reactive to light, conjunctivae normal  ENT: tympanic membrane, external ear and ear canal normal bilaterally, nose without deformity, nasal mucosa and turbinates normal without polyps  Neck: supple and non-tender without mass, no thyromegaly  no cervical lymphadenopathy  Pulmonary/Chest: BI basal crepts++  wheezes, rales or rhonchi, normal air movement, in some respiratory distress  Cardiovascular: l S1 and S2, no murmurs, rubs, clicks, or gallops, no carotid bruits  Abdomen: soft, non-tender, non-distended, normal bowel sounds, no masses or organomegaly  PEG tube   Extremities: Quadriplegia+   Musculoskeletal: normal range of motion, no joint swelling, deformity or tenderness  Integumentary: No rashes, no abnormal skin lesions, no petechiae  Neurologic: Quadriplegia+  Lines: IV     Data Review:    CBC:   Lab Results   Component Value Date    WBC 10.9 01/16/2022    HGB 14.0 01/17/2022    HCT 40.7 01/17/2022    MCV 90.5 01/17/2022     (L) 01/16/2022     RENAL:   Lab Results   Component Value Date    CREATININE 0.6 (L) 01/17/2022    BUN 28 (H) 01/17/2022     01/17/2022    K 4.1 01/17/2022     01/17/2022    CO2 20 (L) 01/17/2022     SED RATE: No results found for: SEDRATE  CK: No results found for: CKTOTAL  CRP:   Lab Results   Component Value Date    CRP 67.0 01/14/2022     Hepatic Function Panel:   Lab Results   Component Value Date    ALKPHOS 98 01/13/2022    ALT 48 01/13/2022    AST 56 01/13/2022    PROT 8.1 01/13/2022    BILITOT 0.6 01/13/2022    LABALBU 4.3 01/13/2022     UA:No results found for: Vernadine Citrin, GLUCOSEU, BILIRUBINUR, Pamela Ale, BLOODU, PHUR, PROTEINU, UROBILINOGEN, NITRU, LEUKOCYTESUR, LABMICR, URINETYPE   Urine Microscopic: No results found for: LABCAST, BACTERIA, COMU, HYALCAST, WBCUA, RBCUA, EPIU  Urine Reflex to Culture: No results found for: URRFLXCULT      MICRO: cultures reviewed and updated by me   Blood Culture:   Procedure Component Value Units Date/Time   Culture, Respiratory [[id number]] Collected: 01/16/22 1420   Order Status: Sent Specimen: Tracheal Aspirate Updated: 01/16/22 1507   Culture, Blood 2 [[id number]] Collected: 01/14/22 0324   Order Status: Completed Specimen: Blood Updated: 01/15/22 0915    Culture, Blood 2 No Growth to date.  Any change in status will be called. Narrative:     ORDER#: [id number]                          ORDERED BY: SHAYAN Marquez   SOURCE: Blood                              COLLECTED:  01/14/22 03:24   ANTIBIOTICS AT [facility]. :                      RECEIVED :  01/14/22 03:35   If child <=2 yrs old please draw pediatric bottle. ~Blood Culture #2   Performed at:   [address] [employer] [address]   Phone [phone]   Culture, Blood 1 [[id number]] Collected: 01/13/22 1902   Order Status: Completed Specimen: Blood Updated: 01/14/22 2115    Blood Culture, Routine No Growth to date.  Any change in status will be called. Narrative:     ORDER#: [id number]                          ORDERED BY: SHAYAN Marquez   SOURCE: Blood                              COLLECTED:  01/13/22 19:02   ANTIBIOTICS AT [facility]. :                      RECEIVED :  01/13/22 19:19   If child <=2 yrs old please draw pediatric bottle. ~Blood Culture 1   Performed at:   [address] [address]   Phone [phone]   Rapid influenza A/B antigens [[id number]] Collected: 01/13/22 1902   Order Status: Completed Specimen: Nares Updated: 01/13/22 1953    Rapid Influenza A Ag Negative    Rapid Influenza B Ag Negative   Narrative:     Performed at:   [facility]   [address] [facility] [address]   Phone [phone] Rapid [[id number]] Collected: 01/13/22 1902   Order Status: Completed Specimen: Nasopharyngeal Swab Updated: 01/13/22 1953    SARS-CoV-2, NAAT Not Detected    Comment: Rapid NAAT:   Negative results should be treated as presumptive and,   if inconsistent with clinical signs and symptoms or necessary for   patient management, should be tested with an alternative molecular   assay. Negative results do not preclude SARS-CoV-2 infection and   should not be used as the sole basis for patient management decisions. This test has been authorized by the FDA under an Emergency Use   Authorization (EUA) for use by authorized laboratories. Fact sheet for Healthcare Providers:   Desi.ghazala   Fact sheet for Patients: Desi.ghazala     METHODOLOGY: Isothermal Nucleic Acid Amplification       Narrative:     Performed at:   [facility]   [address] [facility] [address]   Phone [phone]         Lab Results   Component Value Date    [facility]  01/13/2022     No Growth to date. Any change in status will be called. BLOODCULT2  01/14/2022     No Growth to date. Any change in status will be called. Respiratory Culture:  No results found for: Jason Jackson  AFB:No results found for: [facility]  Viral Culture:  Lab Results   Component Value Date    COVID19 Detected 01/13/2022    COVID19 Not Detected 01/13/2022     Urine Culture: No results for input(s): Espinoza Render in the last 72 hours.       IMAGING:    XR CHEST PORTABLE   Final Result   No significant finding in the chest.         XR CHEST PORTABLE   Final Result      Focal consolidative change within the left lower lobe. Finding may be   suggestive of atelectasis or focal pneumonia. All the pertinent images and reports for the current Hospitalization were reviewed by me     Scheduled Meds:   sodium chloride flush  5-40 mL IntraVENous 2 times per day    enoxaparin  40 mg SubCUTAneous Daily    dexamethasone  10 mg IntraVENous Q24H    piperacillin-tazobactam  3,375 mg IntraVENous Q8H       Continuous Infusions:   sodium chloride         PRN Meds:  albuterol sulfate HFA, sodium chloride flush, sodium chloride, acetaminophen **OR** acetaminophen, ondansetron, guaiFENesin-dextromethorphan      Assessment:     Patient Active Problem List   Diagnosis    COVID-19 virus infection    Pneumonia    Acute on chronic respiratory failure (HCC)        COVID 19 PNA  Hypoxic resp failure on Vent through Trach now weaned off   Quadriplegia from C spine injury   PEG tube in place  Lymphopenia   Thrombocytopenia from COVID 19  Left lower lobe consolidation  CRP elevation   Procal 0.5        Given his resp status will need to cont IV abx and steroids will check Trach aspirate so far negative    Clinically able to wean off the vent and has chronic Trach and PEG      Labs, Microbiology, Radiology and all the pertinent results from current hospitalization and  care every where were reviewed  by me as a part of the evaluation   Plan:   1. Can d/c Zosyn   2. Cont Dexamethasone x cont for x 5 days to complete the course  3. CXR from 1/16/22 improved  4. NO additional abx necessary     Will sign off d/c plans per primary        Discussed with patient/Family and Nursing staff     Thanks for allowing me to participate in your patient's care and please call me with any questions or concerns.     Karo Clancy MD  Infectious Disease  [facility]) Physician  Phone: [phone]   Fax : [phone]

## 2022-01-17 NOTE — PROGRESS NOTES
Patient straight catheterized himself with RN at bedside using his catheters from home.  Got 675mL out, no post void cath residual.

## 2022-01-18 VITALS
OXYGEN SATURATION: 98 % | TEMPERATURE: 97.7 F | HEART RATE: 62 BPM | BODY MASS INDEX: 21.04 KG/M2 | HEIGHT: 60 IN | RESPIRATION RATE: 20 BRPM | WEIGHT: 107.14 LBS | DIASTOLIC BLOOD PRESSURE: 74 MMHG | SYSTOLIC BLOOD PRESSURE: 116 MMHG

## 2022-01-18 LAB
ANION GAP SERPL CALCULATED.3IONS-SCNC: 14 MMOL/L (ref 3–16)
BASOPHILS ABSOLUTE: 0 K/UL (ref 0–0.2)
BASOPHILS RELATIVE PERCENT: 0.2 %
BUN BLDV-MCNC: 23 MG/DL (ref 7–20)
CALCIUM SERPL-MCNC: 8.4 MG/DL (ref 8.3–10.6)
CHLORIDE BLD-SCNC: 102 MMOL/L (ref 99–110)
CO2: 22 MMOL/L (ref 21–32)
CREAT SERPL-MCNC: 0.6 MG/DL (ref 0.9–1.3)
CULTURE, BLOOD 2: NORMAL
EOSINOPHILS ABSOLUTE: 0 K/UL (ref 0–0.6)
EOSINOPHILS RELATIVE PERCENT: 0 %
GFR AFRICAN AMERICAN: >60
GFR NON-AFRICAN AMERICAN: >60
GLUCOSE BLD-MCNC: 106 MG/DL (ref 70–99)
HCT VFR BLD CALC: 42.9 % (ref 40.5–52.5)
HEMOGLOBIN: 14.8 G/DL (ref 13.5–17.5)
LYMPHOCYTES ABSOLUTE: 1.2 K/UL (ref 1–5.1)
LYMPHOCYTES RELATIVE PERCENT: 19.1 %
MAGNESIUM: 2.2 MG/DL (ref 1.8–2.4)
MCH RBC QN AUTO: 30.9 PG (ref 26–34)
MCHC RBC AUTO-ENTMCNC: 34.5 G/DL (ref 31–36)
MCV RBC AUTO: 89.3 FL (ref 80–100)
MONOCYTES ABSOLUTE: 0.7 K/UL (ref 0–1.3)
MONOCYTES RELATIVE PERCENT: 11.2 %
NEUTROPHILS ABSOLUTE: 4.3 K/UL (ref 1.7–7.7)
NEUTROPHILS RELATIVE PERCENT: 69.5 %
PDW BLD-RTO: 13.2 % (ref 12.4–15.4)
PLATELET # BLD: 137 K/UL (ref 135–450)
PLATELET SLIDE REVIEW: NORMAL
PMV BLD AUTO: 10.2 FL (ref 5–10.5)
POTASSIUM SERPL-SCNC: 4 MMOL/L (ref 3.5–5.1)
RBC # BLD: 4.81 M/UL (ref 4.2–5.9)
RBC # BLD: NORMAL 10*6/UL
SLIDE REVIEW: NORMAL
SODIUM BLD-SCNC: 138 MMOL/L (ref 136–145)
WBC # BLD: 6.3 K/UL (ref 4–11)

## 2022-01-18 PROCEDURE — 6370000000 HC RX 637 (ALT 250 FOR IP): Performed by: HOSPITALIST

## 2022-01-18 PROCEDURE — 83735 ASSAY OF MAGNESIUM: CPT

## 2022-01-18 PROCEDURE — 6360000002 HC RX W HCPCS: Performed by: STUDENT IN AN ORGANIZED HEALTH CARE EDUCATION/TRAINING PROGRAM

## 2022-01-18 PROCEDURE — 2580000003 HC RX 258: Performed by: STUDENT IN AN ORGANIZED HEALTH CARE EDUCATION/TRAINING PROGRAM

## 2022-01-18 PROCEDURE — 36415 COLL VENOUS BLD VENIPUNCTURE: CPT

## 2022-01-18 PROCEDURE — 51798 US URINE CAPACITY MEASURE: CPT

## 2022-01-18 PROCEDURE — 85025 COMPLETE CBC W/AUTO DIFF WBC: CPT

## 2022-01-18 PROCEDURE — 6360000002 HC RX W HCPCS: Performed by: INTERNAL MEDICINE

## 2022-01-18 PROCEDURE — 80048 BASIC METABOLIC PNL TOTAL CA: CPT

## 2022-01-18 PROCEDURE — 51701 INSERT BLADDER CATHETER: CPT

## 2022-01-18 RX ORDER — DEXAMETHASONE 6 MG/1
6 TABLET ORAL
Qty: 5 TABLET | Refills: 0 | Status: SHIPPED | OUTPATIENT
Start: 2022-01-18 | End: 2022-01-23

## 2022-01-18 RX ORDER — LOPERAMIDE HCL 1 MG/7.5ML
2 SOLUTION ORAL 4 TIMES DAILY PRN
Qty: 120 ML | Refills: 0 | Status: SHIPPED | OUTPATIENT
Start: 2022-01-18

## 2022-01-18 RX ADMIN — Medication 10 ML: at 09:32

## 2022-01-18 RX ADMIN — DEXAMETHASONE SODIUM PHOSPHATE 6 MG: 10 INJECTION, SOLUTION INTRAMUSCULAR; INTRAVENOUS at 03:56

## 2022-01-18 RX ADMIN — LOPERAMIDE HYDROCHLORIDE 2 MG: 2 SOLUTION ORAL at 12:34

## 2022-01-18 RX ADMIN — LOPERAMIDE HYDROCHLORIDE 2 MG: 2 SOLUTION ORAL at 13:11

## 2022-01-18 RX ADMIN — ENOXAPARIN SODIUM 40 MG: 100 INJECTION SUBCUTANEOUS at 09:31

## 2022-01-18 ASSESSMENT — PAIN SCALES - GENERAL
PAINLEVEL_OUTOF10: 0
PAINLEVEL_OUTOF10: 0

## 2022-01-18 ASSESSMENT — PULMONARY FUNCTION TESTS
PIF_VALUE: 20
PIF_VALUE: 28

## 2022-01-18 NOTE — DISCHARGE SUMMARY
Hospital Medicine Discharge Summary    Patient ID: Tacey Dy      Patient's PCP: Laci Magallanes MD    Admit Date: 1/13/2022     Discharge Date:   1/18/2022    Admitting Physician: iGna Paniagua DO     Discharge Physician: Kya Meneses MD     Discharge Diagnoses: Active Hospital Problems    Diagnosis Date Noted    COVID-19 virus infection [U07.1] 01/13/2022    Pneumonia [J18.9] 01/13/2022    Acute on chronic respiratory failure [facility]) [J96.20] 01/13/2022       The patient was seen and examined on day of discharge and this discharge summary is in conjunction with any daily progress note from day of discharge. Hospital Course: The patient is a 27 y.o. 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 Apparently patient has also had increasing concerning lack of appetite and fatigue. He has seemed to be more short of breath according to the parents and apparently has had some intermittent fever and chills at home. They note that although he is quadriplegic he does have increased activity levels in some time able to ambulate and move appropriately. However in the last 2 to 3 days he has seemed more inactive and fatigued. Uncertain whether patient may have gotten sick from family members or from previous out going to [employer]. Father notes that he, wife, and the patient all tested with a rapid COVID test at home and while the wife and him were both negative patient apparently tested positive which prompted further evaluation in the ED.   Other than symptoms as above, father denies palpable, equal bilaterally       Labs: For convenience and continuity at follow-up the following most recent labs are provided:      CBC:    Lab Results   Component Value Date    WBC 6.3 01/18/2022    HGB 14.8 01/18/2022    HCT 42.9 01/18/2022     01/18/2022       Renal:    Lab Results   Component Value Date     01/18/2022    K 4.0 01/18/2022    K 3.6 01/13/2022     01/18/2022    CO2 22 01/18/2022    BUN 23 01/18/2022    CREATININE 0.6 01/18/2022    CALCIUM 8.4 01/18/2022         Significant Diagnostic Studies    Radiology:   XR CHEST PORTABLE   Final Result   No significant finding in the chest.         XR CHEST PORTABLE   Final Result      Focal consolidative change within the left lower lobe. Finding may be   suggestive of atelectasis or focal pneumonia.                 Consults:     IP CONSULT TO HOSPITALIST  IP CONSULT TO INFECTIOUS DISEASES  IP CONSULT TO HOME CARE NEEDS    Disposition:  home     Condition at Discharge: Stable    Discharge Instructions/Follow-up:  meds as prescribed, follow-up with PCP    Code Status:  Full Code     Activity: activity as tolerated    Diet: tube feeds; standard with fiber      Discharge Medications:     Current Discharge Medication List           Details   dexamethasone (DECADRON) 6 MG tablet Take 1 tablet by mouth daily (with breakfast) for 5 days  Qty: 5 tablet, Refills: 0              Details   acetaminophen (TYLENOL) 160 MG/5ML liquid Take 20mLs every 4 hours as needed through G-tube      bacitracin-polymyxin b (POLYSPORIN) 500-25317 UNIT/GM ointment Apply topically 3 times daily      fluticasone (FLOVENT HFA) 44 MCG/ACT inhaler Inhale 2 puffs into the lungs daily      folic acid (FOLVITE) 1 MG tablet Take 1 mg by mouth daily      Nutritional Supplements (ISOSOURCE 1.5 SOLITARIO) LIQD Take 500 mLs by mouth 3 times daily      loratadine (CLARITIN) 10 MG tablet Take 10 mg by mouth daily as needed             Time Spent on discharge is more than 30 minutes in the examination, evaluation, counseling and review of medications and discharge plan. Signed:    Verónica Ha MD   1/18/2022      Thank you Arvin Ching MD for the opportunity to be involved in this patient's care. If you have any questions or concerns please feel free to contact me at [phone]. Statement Selected

## 2022-01-18 NOTE — DISCHARGE INSTR - COC
Continuity of Care Form    Patient Name: Ivonne Mccollum   :  1990  MRN:  [id number]    Admit date:  2022  Discharge date:  ***    Code Status Order: Full Code   Advance Directives:      Admitting Physician:  Saw Villatoro DO  PCP: Arvin Ching MD    Discharging Nurse: [facility] Unit/Room#: P6R-9348/5214-60  Discharging Unit Phone Number: ***    Emergency Contact:   Extended Emergency Contact Information  Primary Emergency Contact: [address] of [address] [place] [address] Phone: [phone]  Mobile Phone: [phone]  Relation: Parent  Secondary Emergency Contact: [address] [facility] of [address] [place] [address] Phone: [phone]  Relation: Parent    Past Surgical History:  No past surgical history on file. Immunization History: There is no immunization history on file for this patient.     Active Problems:  Patient Active Problem List   Diagnosis Code    COVID-19 virus infection U07.1    Pneumonia J18.9    Acute on chronic respiratory failure (HCC) J96.20       Isolation/Infection:   Isolation            Droplet Plus          Patient Infection Status       Infection Onset Added Last Indicated Last Indicated By Review Planned Expiration Resolved Resolved By    COVID-19 22 COVID-19 22      Resolved    COVID-19 (Rule Out) 22 COVID-19 (Ordered)   22 Rule-Out Test Resulted    COVID-19 (Rule Out) 22 COVID-19, Rapid (Ordered)   22 Rule-Out Test Resulted            Nurse Assessment:  Last Vital Signs: /74   Pulse 62   Temp 97.7 °F (36.5 °C) (Oral)   Resp 20   Ht 5' (1.524 m)   Wt 107 lb 2.3 oz (48.6 kg)   SpO2 98%   BMI 20.93 kg/m²     Last documented pain score (0-10 scale): Pain Level: 0  Last Weight:   Wt Readings from Last 1 Encounters:   22 107 lb 2.3 oz (48.6 kg)     Mental Status:  {IP PT MENTAL STATUS:75493}    IV Access:  [address] [facility] IV CCFJZF:043979502}    Nursing Mobility/ADLs:  Walking   {CHP DME XWVX:156009068}  Transfer  {CHP DME YIEL:233585584}  Bathing  {CHP DME VJZV:308460882}  Dressing  {CHP DME OHID:000771892}  Toileting  {CHP DME VEUZ:130927481}  Feeding  {CHP DME TSVQ:691502141}  Med Admin  {CHP DME YCTB:857679374}  Med Delivery   {[facility] MED Delivery:280133577}    Wound Care Documentation and Therapy:        Elimination:  Continence: Bowel: {YES / KO:50306}  Bladder: {YES / JE:61589}  Urinary Catheter: {Urinary Catheter:353563955}   Colostomy/Ileostomy/Ileal Conduit: {YES / :34752}       Date of Last BM: ***    Intake/Output Summary (Last 24 hours) at 2022 1413  Last data filed at 2022 1310  Gross per 24 hour   Intake 1321 ml   Output 1250 ml   Net 71 ml     I/O last 3 completed shifts:   In: . [I.V.:20; NG/GT:1728; IV Piggyback:304.1]  Out: 2450 [Urine:2450]    Safety Concerns:     [employer] Safety Concerns:654943095}    Impairments/Disabilities:      [employer] Impairments/Disabilities:135580748}    Nutrition Therapy:  Current Nutrition Therapy:   [address] [employer] Diet List:128276074}    Routes of Feeding: {[facility] DME Other Feedings:223446145}  Liquids: {Slp liquid thickness:17009}  Daily Fluid Restriction: {CHP DME Yes amt example:765351470}  Last Modified Barium Swallow with Video (Video Swallowing Test): {Done Not Done OZKC:270750190}    Treatments at the Time of Hospital Discharge:   Respiratory Treatments: ***  Oxygen Therapy:  {Therapy; copd oxygen:85134}  Ventilator:    { CC Vent GWYQ:952100374}    Rehab Therapies: {THERAPEUTIC INTERVENTION:5989669095}  Weight Bearing Status/Restrictions: [address] [employer] Guide  Weight Bearin}  Other Medical Equipment (for information only, NOT a DME order):  {EQUIPMENT:589989114}  Other Treatments: ***    Patient's personal belongings (please select all that are sent with patient):  {SEJALP DME Belongings:977908928}    RN SIGNATURE:  {Esignature:448235534}    CASE MANAGEMENT/SOCIAL WORK SECTION    Inpatient Status Date: ***    Readmission Risk Assessment Score:  Readmission Risk              Risk of Unplanned Readmission:  9           Discharging to Facility/ Agency   Name:   Address:  Phone:  Fax:    Dialysis Facility (if applicable)   Name:  Address:  Dialysis Schedule:  Phone:  Fax:    / signature: {Esignature:956572024}    PHYSICIAN SECTION    Prognosis: Good    Condition at Discharge: Stable    Rehab Potential (if transferring to Rehab): Good    Recommended Labs or Other Treatments After Discharge: meds as prescribed, follow-up with PCP    Physician Certification: I certify the above information and transfer of Andrea Goldberg  is necessary for the continuing treatment of the diagnosis listed and that he requires [address] for less 30 days.      Update Admission H&P: No change in H&P    PHYSICIAN SIGNATURE:  Electronically signed by Celsa Doran MD on 1/18/22 at 2:14 PM EST

## 2022-01-18 NOTE — CARE COORDINATION
CASE MANAGEMENT DISCHARGE SUMMARY:  Spoke to patient over the phone. Patient ready to discharge home. Denies home needs.      DISCHARGE DATE: 1/18/2022    DISCHARGED TO:  Home with family    HOME CARE AGENCY: Patient declines             TRANSPORTATION: Dad             TIME: later this afternoon    Electronically signed by Aaron Farrell RN Case Management [phone] on 1/18/2022 at 2:56 PM

## 2022-01-18 NOTE — PROGRESS NOTES
IV and telemetry monitor removed. Discharge paperwork completed and discussed with the patient and his father. All questions answered. Patient has been given all medications that have been filled by [address] [place] [address] [employer] . All belongings have been packed up and sent with the patient. Patient will be driven home by his father.     Electronically signed by Mata Cheney RN on 1/18/2022 at 3:31 PM

## 2022-01-18 NOTE — PLAN OF CARE
Absence of new skin breakdown  Outcome: Ongoing     Problem: Nutrition  Goal: Optimal nutrition therapy  Outcome: Ongoing

## 2022-01-18 NOTE — PROGRESS NOTES
Speech Language Pathology      Speech Therapy note regarding Clinical Evaluation of Swallowing    This SLP attempted 1/18/2022 at 0835 am  · Pt awake in bed  · Pt voiced recall of this SLP  · SLP reviewed conversation with referring MD regarding order clarification. MD concerned for aspiration pneumonia versus covid pneumonia. SLP reviewed definition of aspiration pneumonia and various etiologies. Pt responded \"I don't think is is aspiration pneumonia\". · SLP reviewed Clinical Evaluation of Swallowing procedure    Pt continued to voice disinterest in assessment. Plan: Evaluation held per pt request. Did suggest pt discuss with family and his PCP. IF there is a concern for aspiration risks with any po consistencies; would suggest outpatient Modified Barium Swallow Study for more objective assessment when pt is no longer in covid 19 contact precautions  · Discussed with RN      Signed  Lottie Carter,MS,CCC,SLP [id number]  Speech and Language Pathologist  1/18/2022 0848 am

## 2022-01-18 NOTE — PROGRESS NOTES
Uneventful shift over night. Patient stable on ventilator over night on 30% O2. Vital signs stable. Straight cath x2 over night, patient tolerated well. Fourth tube feed given without issue. Patient denied pain, discomfort, or shortness of breath. Continue to monitor.

## 2022-01-19 ENCOUNTER — CARE COORDINATION (OUTPATIENT)
Dept: CASE MANAGEMENT | Age: 32
End: 2022-01-19

## 2022-01-19 LAB
CULTURE, RESPIRATORY: ABNORMAL
CULTURE, RESPIRATORY: ABNORMAL
GRAM STAIN RESULT: ABNORMAL
ORGANISM: ABNORMAL

## 2022-01-20 ENCOUNTER — CARE COORDINATION (OUTPATIENT)
Dept: CASE MANAGEMENT | Age: 32
End: 2022-01-20

## 2022-01-20 NOTE — CARE COORDINATION
Transitions of Care Call  Call within 2 business days of discharge: Yes    Patient: Norma Garcia Patient : 1990   MRN: [id number]  Reason for Admission: Covid  Discharge Date: 22 RARS: Readmission Risk Score: 9.9 ( )      Last Discharge [facility]       Complaint Diagnosis Description Type Department Provider    22 Positive For Covid-19 Pneumonia of left lower lobe due to infectious organism . .. ED to Hosp-Admission (Discharged) (ADMITTED) CHANO FrenchN Gregory Casillas MD; Gwendalyn Moritz. .. Was this an external facility discharge? No Discharge Facility: n/a    Challenges to be reviewed by the provider   Additional needs identified to be addressed with provider: No  none                 Encounter was not routed to provider for escalation. Method of communication with provider: none. Second and final outreach call attempt, no answer. CTN left VM with contact information and request for return call. CTN will resolve episode and remain available. No active my chart    Pt has a [facility] p[facility] and therefore CTN cannot route message to PCP to facilitate Hospital follow up appointment.     ROSEANNA Sosa, RN   Care Transition Nurse  Mobile: [phone]

## 2022-04-22 ENCOUNTER — APPOINTMENT (OUTPATIENT)
Dept: GENERAL RADIOLOGY | Age: 32
End: 2022-04-22
Payer: COMMERCIAL

## 2022-04-22 ENCOUNTER — HOSPITAL ENCOUNTER (EMERGENCY)
Age: 32
Discharge: HOME | End: 2022-04-22
Attending: EMERGENCY MEDICINE
Payer: COMMERCIAL

## 2022-04-22 DIAGNOSIS — J18.9 PNEUMONIA OF BOTH LUNGS DUE TO INFECTIOUS ORGANISM, UNSPECIFIED PART OF LUNG: Primary | ICD-10-CM

## 2022-04-22 LAB
A/G RATIO: 1.1 (ref 1.1–2.2)
ALBUMIN SERPL-MCNC: 4 G/DL (ref 3.4–5)
ALP BLD-CCNC: 118 U/L (ref 40–129)
ALT SERPL-CCNC: 13 U/L (ref 10–40)
ANION GAP SERPL CALCULATED.3IONS-SCNC: 13 MMOL/L (ref 3–16)
ANISOCYTOSIS: ABNORMAL
AST SERPL-CCNC: 28 U/L (ref 15–37)
BASOPHILS ABSOLUTE: 0 K/UL (ref 0–0.2)
BASOPHILS RELATIVE PERCENT: 0 %
BILIRUB SERPL-MCNC: 0.5 MG/DL (ref 0–1)
BUN BLDV-MCNC: 27 MG/DL (ref 7–20)
CALCIUM SERPL-MCNC: 9.5 MG/DL (ref 8.3–10.6)
CHLORIDE BLD-SCNC: 103 MMOL/L (ref 99–110)
CO2: 22 MMOL/L (ref 21–32)
CREAT SERPL-MCNC: 0.7 MG/DL (ref 0.9–1.3)
EOSINOPHILS ABSOLUTE: 0.9 K/UL (ref 0–0.6)
EOSINOPHILS RELATIVE PERCENT: 12 %
GFR AFRICAN AMERICAN: >60
GFR NON-AFRICAN AMERICAN: >60
GLUCOSE BLD-MCNC: 134 MG/DL (ref 70–99)
HCT VFR BLD CALC: 39.6 % (ref 40.5–52.5)
HEMOGLOBIN: 12.8 G/DL (ref 13.5–17.5)
LACTIC ACID, SEPSIS: 1 MMOL/L (ref 0.4–1.9)
LYMPHOCYTES ABSOLUTE: 1.2 K/UL (ref 1–5.1)
LYMPHOCYTES RELATIVE PERCENT: 16 %
MCH RBC QN AUTO: 29.1 PG (ref 26–34)
MCHC RBC AUTO-ENTMCNC: 32.4 G/DL (ref 31–36)
MCV RBC AUTO: 89.9 FL (ref 80–100)
MICROCYTES: ABNORMAL
MONOCYTES ABSOLUTE: 0.7 K/UL (ref 0–1.3)
MONOCYTES RELATIVE PERCENT: 10 %
NEUTROPHILS ABSOLUTE: 4.6 K/UL (ref 1.7–7.7)
NEUTROPHILS RELATIVE PERCENT: 62 %
PDW BLD-RTO: 12.9 % (ref 12.4–15.4)
PLATELET # BLD: 224 K/UL (ref 135–450)
PLATELET SLIDE REVIEW: ADEQUATE
PMV BLD AUTO: 10 FL (ref 5–10.5)
POLYCHROMASIA: ABNORMAL
POTASSIUM REFLEX MAGNESIUM: 3.7 MMOL/L (ref 3.5–5.1)
PRO-BNP: 69 PG/ML (ref 0–124)
RAPID INFLUENZA  B AGN: NEGATIVE
RAPID INFLUENZA A AGN: NEGATIVE
RBC # BLD: 4.41 M/UL (ref 4.2–5.9)
SLIDE REVIEW: ABNORMAL
SODIUM BLD-SCNC: 138 MMOL/L (ref 136–145)
TOTAL PROTEIN: 7.6 G/DL (ref 6.4–8.2)
TROPONIN: <0.01 NG/ML
WBC # BLD: 7.4 K/UL (ref 4–11)

## 2022-04-22 PROCEDURE — 87040 BLOOD CULTURE FOR BACTERIA: CPT

## 2022-04-22 PROCEDURE — 93005 ELECTROCARDIOGRAM TRACING: CPT | Performed by: EMERGENCY MEDICINE

## 2022-04-22 PROCEDURE — 6370000000 HC RX 637 (ALT 250 FOR IP): Performed by: EMERGENCY MEDICINE

## 2022-04-22 PROCEDURE — 83880 ASSAY OF NATRIURETIC PEPTIDE: CPT

## 2022-04-22 PROCEDURE — 96365 THER/PROPH/DIAG IV INF INIT: CPT

## 2022-04-22 PROCEDURE — 87804 INFLUENZA ASSAY W/OPTIC: CPT

## 2022-04-22 PROCEDURE — 85025 COMPLETE CBC W/AUTO DIFF WBC: CPT

## 2022-04-22 PROCEDURE — 84484 ASSAY OF TROPONIN QUANT: CPT

## 2022-04-22 PROCEDURE — 80053 COMPREHEN METABOLIC PANEL: CPT

## 2022-04-22 PROCEDURE — 36415 COLL VENOUS BLD VENIPUNCTURE: CPT

## 2022-04-22 PROCEDURE — 71046 X-RAY EXAM CHEST 2 VIEWS: CPT

## 2022-04-22 PROCEDURE — 6360000002 HC RX W HCPCS: Performed by: EMERGENCY MEDICINE

## 2022-04-22 PROCEDURE — 99285 EMERGENCY DEPT VISIT HI MDM: CPT

## 2022-04-22 PROCEDURE — 2580000003 HC RX 258: Performed by: EMERGENCY MEDICINE

## 2022-04-22 PROCEDURE — 83605 ASSAY OF LACTIC ACID: CPT

## 2022-04-22 RX ORDER — LEVOFLOXACIN 500 MG/1
500 TABLET, FILM COATED ORAL DAILY
Qty: 10 TABLET | Refills: 0 | Status: SHIPPED | OUTPATIENT
Start: 2022-04-22 | End: 2022-05-02

## 2022-04-22 RX ORDER — PREDNISOLONE 15 MG/5ML
1 SOLUTION ORAL DAILY
Qty: 125 ML | Refills: 0 | Status: SHIPPED | OUTPATIENT
Start: 2022-04-22 | End: 2022-04-29

## 2022-04-22 RX ORDER — PREDNISONE 20 MG/1
40 TABLET ORAL ONCE
Status: COMPLETED | OUTPATIENT
Start: 2022-04-22 | End: 2022-04-22

## 2022-04-22 RX ORDER — 0.9 % SODIUM CHLORIDE 0.9 %
30 INTRAVENOUS SOLUTION INTRAVENOUS ONCE
Status: COMPLETED | OUTPATIENT
Start: 2022-04-22 | End: 2022-04-22

## 2022-04-22 RX ORDER — ACETAMINOPHEN 160 MG/5ML
640 SOLUTION ORAL ONCE
Status: COMPLETED | OUTPATIENT
Start: 2022-04-22 | End: 2022-04-22

## 2022-04-22 RX ORDER — IPRATROPIUM BROMIDE AND ALBUTEROL SULFATE 2.5; .5 MG/3ML; MG/3ML
1 SOLUTION RESPIRATORY (INHALATION) ONCE
Status: COMPLETED | OUTPATIENT
Start: 2022-04-22 | End: 2022-04-22

## 2022-04-22 RX ORDER — LEVOFLOXACIN 5 MG/ML
500 INJECTION, SOLUTION INTRAVENOUS ONCE
Status: COMPLETED | OUTPATIENT
Start: 2022-04-22 | End: 2022-04-22

## 2022-04-22 RX ADMIN — SODIUM CHLORIDE 1584 ML: 9 INJECTION, SOLUTION INTRAVENOUS at 20:13

## 2022-04-22 RX ADMIN — LEVOFLOXACIN 500 MG: 5 INJECTION, SOLUTION INTRAVENOUS at 20:40

## 2022-04-22 RX ADMIN — ACETAMINOPHEN ORAL SOLUTION 640 MG: 650 SOLUTION ORAL at 20:42

## 2022-04-22 RX ADMIN — PREDNISONE 40 MG: 20 TABLET ORAL at 22:23

## 2022-04-22 RX ADMIN — IPRATROPIUM BROMIDE AND ALBUTEROL SULFATE 1 AMPULE: .5; 3 SOLUTION RESPIRATORY (INHALATION) at 20:05

## 2022-04-22 ASSESSMENT — PAIN - FUNCTIONAL ASSESSMENT
PAIN_FUNCTIONAL_ASSESSMENT: NONE - DENIES PAIN
PAIN_FUNCTIONAL_ASSESSMENT: NONE - DENIES PAIN

## 2022-04-22 ASSESSMENT — ENCOUNTER SYMPTOMS
VOMITING: 0
SORE THROAT: 0
NAUSEA: 0
RHINORRHEA: 0
ABDOMINAL PAIN: 0
COUGH: 1
EYE REDNESS: 0
BACK PAIN: 0
EYE PAIN: 0
EYE DISCHARGE: 0
SHORTNESS OF BREATH: 1
DIARRHEA: 0
WHEEZING: 0

## 2022-04-22 ASSESSMENT — PAIN DESCRIPTION - ORIENTATION: ORIENTATION: LEFT

## 2022-04-22 ASSESSMENT — PAIN DESCRIPTION - LOCATION: LOCATION: CHEST

## 2022-04-22 ASSESSMENT — LIFESTYLE VARIABLES: HOW OFTEN DO YOU HAVE A DRINK CONTAINING ALCOHOL: NEVER

## 2022-04-22 ASSESSMENT — PAIN SCALES - GENERAL: PAINLEVEL_OUTOF10: 6

## 2022-04-23 VITALS
WEIGHT: 116.4 LBS | DIASTOLIC BLOOD PRESSURE: 63 MMHG | RESPIRATION RATE: 20 BRPM | OXYGEN SATURATION: 97 % | BODY MASS INDEX: 22.85 KG/M2 | HEART RATE: 94 BPM | SYSTOLIC BLOOD PRESSURE: 96 MMHG | TEMPERATURE: 98.5 F | HEIGHT: 60 IN

## 2022-04-23 LAB
EKG ATRIAL RATE: 97 BPM
EKG DIAGNOSIS: NORMAL
EKG P AXIS: 70 DEGREES
EKG P-R INTERVAL: 142 MS
EKG Q-T INTERVAL: 316 MS
EKG QRS DURATION: 74 MS
EKG QTC CALCULATION (BAZETT): 401 MS
EKG R AXIS: 81 DEGREES
EKG T AXIS: 46 DEGREES
EKG VENTRICULAR RATE: 97 BPM

## 2022-04-23 PROCEDURE — 93010 ELECTROCARDIOGRAM REPORT: CPT | Performed by: INTERNAL MEDICINE

## 2022-04-23 NOTE — ED NOTES
Discharge instructions reviewed with patient and his father. Both verbalized understanding and deny further questions. Successful teach back occurred. Offered patient wheelchair for discharge and he declined. Discharged ambulatory with slow steady gait to ED lobby. Written discharge instructions provided to patient. Prescriptions x1 sent electronically to patient's pharmacy.       Emilie Mustafa RN  04/23/22 5050

## 2022-04-23 NOTE — ED PROVIDER NOTES
[place] of Care Note:    I assumed care of this patient at 1930 from Dr. Tello Kaye, please see Tello Kaye note for more detail. Briefly, this pt is a 79-year-old white male with a tracheostomy and intermittent ventilator who presents complaining of cough and shortness of breath. Laboratory studies and radiographic studies were reviewed. Chest x-ray reveals multifocal infiltrates. Patient at the time of discharge was afebrile without tachycardia, tachypnea or hypoxia. Patient had no leukocytosis. Lactic acid level was 1.0. Patient was given IV fluids, Levaquin and prednisone in the emergency department. Influenza negative    Medical decision making: Shared decision-making was used with this patient given the presence of multifocal pneumonia but the absence of tachycardia, dyspnea, tachypnea, hypoxia, leukocytosis or lactic acidosis for conservative outpatient management with close outpatient follow-up or immediate return to the emergency room for worsening or progressive symptoms. Patient will be given a 10-day course of Levaquin and a 7-day course of prednisone. Patient was advised to continue all home medications. I believe outpatient management is reasonable for this patient under the circumstances as he has good home support and understanding about his current symptoms and does wish to be discharged home at this time. FINAL IMPRESSION      1.  Pneumonia of both lungs due to infectious organism, unspecified part of lung              Katrinka Boas, MD  04/22/22 9541

## 2022-04-23 NOTE — ED NOTES
Dr. Kirsty King in room to discuss test results and discharge plan of care with patient. Patient will no longer need urinalysis and VBG.       Angela Nunez RN  04/23/22 0003

## 2022-04-23 NOTE — ED PROVIDER NOTES
[facility]  eMERGENCY dEPARTMENT eNCOUnter        Pt Name: Lexy Kelsey  MRN: [id number]  [place] 1990  Date of evaluation: 4/22/2022  Provider: Callum Del Rosario MD  PCP: Francisco Vanegas MD      [address] [place] [address]       Chief Complaint   Patient presents with    Shortness of Breath     Cough, congestion, fever and chills started Tuesday around 2000. Has been using ventilator almost around the clock since ill, usually only uses at night. HISTORY OFPRESENT ILLNESS   (Location/Symptom, Timing/Onset, Context/Setting, Quality, Duration, Modifying Factors,Severity)  Note limiting factors. Lexy Kelsey is a 27 y.o. male       Location/Symptom: Cough congestion fever. Concern for pneumonia  Timing/Onset: Symptoms started Tuesday which is 3 days ago  Context/Setting: Patient is a tracheostomy patient. He is a spinal cord injury from birth. However he does walk. He does use inhalers. I actually saw this patient in January of this year. At that time he was admitted with pneumonia and COVID. He was hospitalized for 5 days. Symptoms never really completely resolved. However this past Tuesday he had a sore throat which has now progressed to coughing shortness of breath and diaphoresis. Quality: Cough is productive  Duration: 3 days  Modifying Factors: Nothing specific but he has had diaphoresis fever and chills. Severity: He is not really in any severe pain. Nursing Noteswere all reviewed and agreed with or any disagreements were addressed  in the HPI. REVIEW OF SYSTEMS    (2-9 systems for level 4, 10 or more for level 5)     Review of Systems   Constitutional: Positive for activity change, chills, fatigue and fever. HENT: Negative for ear pain, rhinorrhea and sore throat. Eyes: Negative for pain, discharge, redness and visual disturbance. Respiratory: Positive for cough and shortness of breath. Negative for wheezing.     Cardiovascular: Negative for chest pain, palpitations and leg swelling. Gastrointestinal: Negative for abdominal pain, diarrhea, nausea and vomiting. Genitourinary: Negative for difficulty urinating and dysuria. Musculoskeletal: Negative for arthralgias, back pain and myalgias. Skin: Negative for rash. Allergic/Immunologic: Negative for environmental allergies. Neurological: Positive for headaches. Negative for dizziness, seizures and syncope. Hematological: Negative for adenopathy. Psychiatric/Behavioral: Negative for suicidal ideas. The patient is not nervous/anxious. PAST MEDICAL HISTORY     Past Medical History:   Diagnosis Date    Spinal cord injury at C1-C4 level without injury of spinal bone ([facility] [address]          SURGICAL HISTORY     Past Surgical History:   Procedure Laterality Date    GASTROSTOMY TUBE CHANGE      HIP SURGERY      TRACHEOSTOMY           CURRENTMEDICATIONS       Previous Medications    ACETAMINOPHEN (TYLENOL) 160 MG/5ML LIQUID    Take 20mLs every 4 hours as needed through G-tube    BACITRACIN-POLYMYXIN B (POLYSPORIN) [id number] UNIT/GM OINTMENT    Apply topically 3 times daily    FLUTICASONE (FLOVENT HFA) 44 MCG/ACT INHALER    Inhale 2 puffs into the lungs daily    FOLIC ACID (FOLVITE) 1 MG TABLET    Take 1 mg by mouth daily    LOPERAMIDE HCL (IMODIUM) 1 MG/7.5ML SOLN SOLUTION    Take 15 mLs by mouth 4 times daily as needed for Diarrhea    LORATADINE (CLARITIN) 10 MG TABLET    Take 10 mg by mouth daily as needed    NUTRITIONAL SUPPLEMENTS (ISOSOURCE 1.5 SOLITARIO) LIQD    Take 500 mLs by mouth 3 times daily       ALLERGIES     Other    FAMILY HISTORY     History reviewed. No pertinent family history.        SOCIAL HISTORY       Social History     Socioeconomic History    Marital status: Single     Spouse name: None    Number of children: None    Years of education: None    Highest education level: None   Occupational History    None   Tobacco Use    Smoking status: Never Smoker    Smokeless tobacco: Never Used   Vaping Use    Vaping Use: Never used   Substance and Sexual Activity    Alcohol use: Never    Drug use: Never    Sexual activity: None   Other Topics Concern    None   Social History Narrative    None     Social Determinants of Health     Financial Resource Strain:     Difficulty of Paying Living Expenses: Not on file   Food Insecurity:     Worried About Running Out of Food in the Last Year: Not on file    Delia of Food in the Last Year: Not on file   Transportation Needs:     Lack of Transportation (Medical): Not on file    Lack of Transportation (Non-Medical):  Not on file   Physical Activity:     Days of Exercise per Week: Not on file    Minutes of Exercise per Session: Not on file   Stress:     Feeling of Stress : Not on file   Social Connections:     Frequency of Communication with Friends and Family: Not on file    Frequency of Social Gatherings with Friends and Family: Not on file    Attends Anabaptism Services: Not on file    Active Member of [address] [place] [address] or Organizations: Not on file    Attends Club or Organization Meetings: Not on file    Marital Status: Not on file   Intimate Partner Violence:     Fear of Current or Ex-Partner: Not on file    Emotionally Abused: Not on file    Physically Abused: Not on file    Sexually Abused: Not on file   Housing Stability:     Unable to Pay for Housing in the Last Year: Not on file    Number of [place] in the Last Year: Not on file    Unstable Housing in the Last Year: Not on file       SCREENINGS    [place] Coma Scale  Eye Opening: Spontaneous  Best Verbal Response: Oriented  Best Motor Response: Obeys commands  [place] Coma Scale Score: 15        PHYSICAL EXAM    (up to 7 for level 4, 8 or more for level 5)     ED Triage Vitals [04/22/22 1914]   BP Temp Temp Source Pulse Resp SpO2 Height Weight   110/71 100.3 °F (37.9 °C) Oral 103 20 96 % 5' (1.524 m) 116 lb 6.5 oz (52.8 kg)      height is 5' (1.524 m) and weight is 116 lb 6.5 oz (52.8 kg). His oral temperature is 100.3 °F (37.9 °C). His blood pressure is 110/71 and his pulse is 103. His respiration is 20 and oxygen saturation is 96%. Physical Exam  Constitutional:       Appearance: He is well-developed. He is not diaphoretic. HENT:      Head: Normocephalic and atraumatic. Right Ear: External ear normal.      Left Ear: External ear normal.   Eyes:      General: No scleral icterus. Right eye: No discharge. Left eye: No discharge. Neck:      Thyroid: No thyromegaly. Vascular: No JVD. Trachea: No tracheal deviation. Comments: Patient has a tracheostomy  Cardiovascular:      Rate and Rhythm: Normal rate and regular rhythm. Heart sounds: No murmur heard. No friction rub. No gallop. Pulmonary:      Effort: Pulmonary effort is normal. Tachypnea present. No respiratory distress. Breath sounds: No stridor. Decreased breath sounds and rhonchi present. No wheezing or rales. Abdominal:      General: There is no distension. Palpations: Abdomen is soft. Tenderness: There is no abdominal tenderness. There is no guarding or rebound. Comments: Patient has a gastrostomy tube   Musculoskeletal:         General: No tenderness. Cervical back: Normal range of motion. Skin:     General: Skin is warm and dry. Findings: No rash (On exposed body surfaces). Neurological:      Mental Status: He is alert and oriented to person, place, and time. Coordination: Coordination normal.   Psychiatric:         Behavior: Behavior normal.         Thought Content: Thought content normal.         DIAGNOSTIC RESULTS   LABS:    No results found for this visit on 04/22/22. All other labs were within normal range or not returned as of this dictation. EKG:  All EKG's are interpreted by the Emergency Department Physician who either signs orCo-signs this chart in the absence of a cardiologist.    EKG visualized preliminarily interpreted by myself shows sinus rhythm. The rate is 97 with an axis of 81. ST-T waves intervals are unremarkable. There is some tremor artifact. No acute injury. RADIOLOGY:   Non-plain film images such as CT, Ultrasound and MRI are read by the radiologist. Plain radiographic images are visualized and preliminarily interpreted by the  EDProvider with the below findings:    Pending        PROCEDURES   Unless otherwise noted below, none     Procedures    CRITICAL CARE TIME   N/A    CONSULTS:  None    EMERGENCY DEPARTMENT COURSE and DIFFERENTIAL DIAGNOSIS/MDM:   Vitals:    Vitals:    04/22/22 1914   BP: 110/71   Pulse: 103   Resp: 20   Temp: 100.3 °F (37.9 °C)   TempSrc: Oral   SpO2: 96%   Weight: 116 lb 6.5 oz (52.8 kg)   Height: 5' (1.524 m)       Patient was given the following medications:  Medications   acetaminophen (TYLENOL) 160 MG/5ML solution 640 mg (has no administration in time range)   ipratropium-albuterol (DUONEB) nebulizer solution 1 ampule (has no administration in time range)       It is past the end of my shift and the case is being turned over to Dr. Jeff Hanson for final diagnosis and disposition. FINAL IMPRESSION    No diagnosis found. DISPOSITION/PLAN    DISPOSITION        PATIENT REFERRED TO:  No follow-up provider specified.     DISCHARGE MEDICATIONS:  New Prescriptions    No medications on file       DISCONTINUED MEDICATIONS:  Discontinued Medications    No medications on file              (Please note that portions of this note were completed with a voice recognition program.  Efforts were made to editthe dictations but occasionally words are mis-transcribed.)    Rena Singh MD (electronically signed)            Rena Singh MD  04/22/22 2019

## 2022-04-23 NOTE — ED TRIAGE NOTES
Patient ambulatory to Room 11 with c/o shortness of breath, cough, congestion, fever and chills that started on Tuesday. Patient has C1-C3 spinal cord injury he sustained at birth, has tracheostomy and Gtube. Patient states he typically uses his ventilator only at night but since he has been sick he has required the ventilator almost 247/7. Patient c/o intermittent pain to left side that he states is because he is having a hard time breathing. Patient is awake, alert, oriented, respirations slightly labored at present. Skin w/d, MMM & pink, cap refill risk. Denies vomiting and diarrhea, denies urinary symptoms. Patient's father at bedside. Dr. Edita Henley at bedside.

## 2022-04-23 NOTE — ED NOTES
Patient's father went home to get extension set for patient's [facility] button G-tube for medication administration.       Curtis Holden, RN  04/22/22 7742

## 2022-04-27 LAB
BLOOD CULTURE, ROUTINE: NORMAL
CULTURE, BLOOD 2: NORMAL

## 2023-10-03 ENCOUNTER — APPOINTMENT (OUTPATIENT)
Dept: GENERAL RADIOLOGY | Age: 33
End: 2023-10-03
Payer: COMMERCIAL

## 2023-10-03 ENCOUNTER — HOSPITAL ENCOUNTER (EMERGENCY)
Age: 33
Discharge: HOME | End: 2023-10-03
Payer: COMMERCIAL

## 2023-10-03 VITALS
TEMPERATURE: 98 F | WEIGHT: 119.71 LBS | RESPIRATION RATE: 16 BRPM | HEART RATE: 80 BPM | SYSTOLIC BLOOD PRESSURE: 83 MMHG | OXYGEN SATURATION: 97 % | DIASTOLIC BLOOD PRESSURE: 46 MMHG | BODY MASS INDEX: 23.5 KG/M2 | HEIGHT: 60 IN

## 2023-10-03 DIAGNOSIS — J20.8 ACUTE BRONCHITIS DUE TO COVID-19 VIRUS: Primary | ICD-10-CM

## 2023-10-03 DIAGNOSIS — U07.1 ACUTE BRONCHITIS DUE TO COVID-19 VIRUS: Primary | ICD-10-CM

## 2023-10-03 PROCEDURE — 71046 X-RAY EXAM CHEST 2 VIEWS: CPT

## 2023-10-03 PROCEDURE — 99283 EMERGENCY DEPT VISIT LOW MDM: CPT

## 2023-10-03 RX ORDER — ALBUTEROL SULFATE 90 UG/1
1-2 AEROSOL, METERED RESPIRATORY (INHALATION) EVERY 6 HOURS PRN
Qty: 18 G | Refills: 0 | Status: SHIPPED | OUTPATIENT
Start: 2023-10-03

## 2023-10-03 RX ORDER — CEFDINIR 300 MG/1
300 CAPSULE ORAL 2 TIMES DAILY
Qty: 20 CAPSULE | Refills: 0 | Status: SHIPPED | OUTPATIENT
Start: 2023-10-03 | End: 2023-10-13

## 2023-10-03 RX ORDER — PREDNISOLONE SODIUM PHOSPHATE 15 MG/5ML
15 SOLUTION ORAL DAILY
Qty: 25 ML | Refills: 0 | Status: SHIPPED | OUTPATIENT
Start: 2023-10-03 | End: 2023-10-08

## 2023-10-03 ASSESSMENT — PATIENT HEALTH QUESTIONNAIRE - PHQ9
SUM OF ALL RESPONSES TO PHQ QUESTIONS 1-9: 0
1. LITTLE INTEREST OR PLEASURE IN DOING THINGS: 0
SUM OF ALL RESPONSES TO PHQ QUESTIONS 1-9: 0
SUM OF ALL RESPONSES TO PHQ9 QUESTIONS 1 & 2: 0
SUM OF ALL RESPONSES TO PHQ QUESTIONS 1-9: 0
SUM OF ALL RESPONSES TO PHQ QUESTIONS 1-9: 0
2. FEELING DOWN, DEPRESSED OR HOPELESS: 0

## 2023-10-03 ASSESSMENT — PAIN - FUNCTIONAL ASSESSMENT
PAIN_FUNCTIONAL_ASSESSMENT: NONE - DENIES PAIN
PAIN_FUNCTIONAL_ASSESSMENT: ACTIVITIES ARE NOT PREVENTED
PAIN_FUNCTIONAL_ASSESSMENT: 0-10

## 2023-10-03 ASSESSMENT — PAIN DESCRIPTION - LOCATION: LOCATION: CHEST

## 2023-10-03 ASSESSMENT — PAIN DESCRIPTION - DESCRIPTORS: DESCRIPTORS: DISCOMFORT

## 2023-10-03 ASSESSMENT — PAIN SCALES - GENERAL: PAINLEVEL_OUTOF10: 4

## 2023-10-03 ASSESSMENT — PAIN DESCRIPTION - ONSET: ONSET: ON-GOING

## 2023-10-03 ASSESSMENT — PAIN DESCRIPTION - PAIN TYPE: TYPE: ACUTE PAIN

## 2023-10-03 NOTE — DISCHARGE INSTRUCTIONS
X-ray negative for pneumonia. I believe you have a COVID bronchitis. You are at risk for developing pneumonia secondarily. We will treat with Omnicef. These are capsules. These can be open and put through your feeding tube. In addition I did prescribe Prelone/prednisolone. I did refill your albuterol inhaler.

## 2023-10-03 NOTE — ED PROVIDER NOTES
[address] [facility] [place] [address] [address]        Pt Name: Kassie Villalobos  MRN: [id number]  [address] [facility] 1990  Date of evaluation: 10/3/2023  Provider: Meche Segal PA-C  PCP: Saurav Redmond MD  Note Started: 12:24 PM EDT 10/3/23      JACQUES. I have evaluated this patient. CHIEF COMPLAINT       Chief Complaint   Patient presents with    Positive For Covid-19     Per pt, tested + for COVID on Friday; called PCP sent to the ED + cough; productive cough +, + CP, +SOB, +fevers. HISTORY OF PRESENT ILLNESS: 1 or more Elements     History From: Patient    Kassie Villalobos is a 28 y.o. male who presents to the emergency department at request of PCP for evaluation pneumonia. The patient's symptom began 1 week ago. On Friday he did a home COVID test which was positive. He a conversation with healthcare provider recommended ED evaluation rule pneumonia. Patient has compromised health as he had spinal cord injury at birth. States he coded multiple times as infant. He has tracheostomy in place. No complications from this. Patient feeds through G-tube. He indicates no fevers or chills at this time. His symptoms did include fever, chills, cough with yellow sputum production, shortness of breath with history of asthma. He does not smoke. He does request refill on his albuterol and he does have a nebulizer at home. Nursing Notes were all reviewed and agreed with or any disagreements were addressed in the HPI. REVIEW OF SYSTEMS :      Review of Systems    Positives and Pertinent negatives as per HPI.      SURGICAL HISTORY     Past Surgical History:   Procedure Laterality Date    GASTROSTOMY TUBE CHANGE      HIP SURGERY      TRACHEOSTOMY         CURRENTMEDICATIONS       Previous Medications    ACETAMINOPHEN (TYLENOL) 160 MG/5ML LIQUID    Take 20mLs every 4 hours as needed through G-tube    BACITRACIN-POLYMYXIN B (POLYSPORIN) [id number] UNIT/GM OINTMENT